# Patient Record
Sex: FEMALE | Race: WHITE | NOT HISPANIC OR LATINO | Employment: OTHER | URBAN - METROPOLITAN AREA
[De-identification: names, ages, dates, MRNs, and addresses within clinical notes are randomized per-mention and may not be internally consistent; named-entity substitution may affect disease eponyms.]

---

## 2023-04-17 LAB
ALANINE AMINOTRANSFERASE (SGPT) (U/L) IN SER/PLAS: 13 U/L (ref 7–45)
ALBUMIN (G/DL) IN SER/PLAS: 4 G/DL (ref 3.4–5)
ALKALINE PHOSPHATASE (U/L) IN SER/PLAS: 48 U/L (ref 33–136)
ANION GAP IN SER/PLAS: 12 MMOL/L (ref 10–20)
ASPARTATE AMINOTRANSFERASE (SGOT) (U/L) IN SER/PLAS: 18 U/L (ref 9–39)
BILIRUBIN TOTAL (MG/DL) IN SER/PLAS: 1 MG/DL (ref 0–1.2)
CALCIDIOL (25 OH VITAMIN D3) (NG/ML) IN SER/PLAS: 33 NG/ML
CALCIUM (MG/DL) IN SER/PLAS: 9.3 MG/DL (ref 8.6–10.3)
CARBON DIOXIDE, TOTAL (MMOL/L) IN SER/PLAS: 25 MMOL/L (ref 21–32)
CHLORIDE (MMOL/L) IN SER/PLAS: 103 MMOL/L (ref 98–107)
CHOLESTEROL (MG/DL) IN SER/PLAS: 142 MG/DL (ref 0–199)
CHOLESTEROL IN HDL (MG/DL) IN SER/PLAS: 68.7 MG/DL
CHOLESTEROL/HDL RATIO: 2.1
COBALAMIN (VITAMIN B12) (PG/ML) IN SER/PLAS: 1256 PG/ML (ref 211–911)
CREATININE (MG/DL) IN SER/PLAS: 0.89 MG/DL (ref 0.5–1.05)
ERYTHROCYTE DISTRIBUTION WIDTH (RATIO) BY AUTOMATED COUNT: 12.8 % (ref 11.5–14.5)
ERYTHROCYTE MEAN CORPUSCULAR HEMOGLOBIN CONCENTRATION (G/DL) BY AUTOMATED: 32.6 G/DL (ref 32–36)
ERYTHROCYTE MEAN CORPUSCULAR VOLUME (FL) BY AUTOMATED COUNT: 96 FL (ref 80–100)
ERYTHROCYTES (10*6/UL) IN BLOOD BY AUTOMATED COUNT: 4.31 X10E12/L (ref 4–5.2)
GFR FEMALE: 67 ML/MIN/1.73M2
GLUCOSE (MG/DL) IN SER/PLAS: 94 MG/DL (ref 74–99)
HEMATOCRIT (%) IN BLOOD BY AUTOMATED COUNT: 41.4 % (ref 36–46)
HEMOGLOBIN (G/DL) IN BLOOD: 13.5 G/DL (ref 12–16)
LDL: 60 MG/DL (ref 0–99)
LEUKOCYTES (10*3/UL) IN BLOOD BY AUTOMATED COUNT: 6.2 X10E9/L (ref 4.4–11.3)
MAGNESIUM (MG/DL) IN SER/PLAS: 1.76 MG/DL (ref 1.6–2.4)
PLATELETS (10*3/UL) IN BLOOD AUTOMATED COUNT: 257 X10E9/L (ref 150–450)
POTASSIUM (MMOL/L) IN SER/PLAS: 4.4 MMOL/L (ref 3.5–5.3)
PROTEIN TOTAL: 7.1 G/DL (ref 6.4–8.2)
SODIUM (MMOL/L) IN SER/PLAS: 136 MMOL/L (ref 136–145)
THYROTROPIN (MIU/L) IN SER/PLAS BY DETECTION LIMIT <= 0.05 MIU/L: 1.67 MIU/L (ref 0.44–3.98)
TRIGLYCERIDE (MG/DL) IN SER/PLAS: 67 MG/DL (ref 0–149)
UREA NITROGEN (MG/DL) IN SER/PLAS: 22 MG/DL (ref 6–23)
VLDL: 13 MG/DL (ref 0–40)

## 2023-04-20 PROBLEM — E55.9 VITAMIN D DEFICIENCY: Status: ACTIVE | Noted: 2023-04-20

## 2023-04-20 PROBLEM — R92.0 ABNORMAL FINDING ON MAMMOGRAPHY, MICROCALCIFICATION: Status: ACTIVE | Noted: 2023-04-20

## 2023-04-20 PROBLEM — M25.561 CHRONIC PAIN OF BOTH KNEES: Status: ACTIVE | Noted: 2023-04-20

## 2023-04-20 PROBLEM — R60.9 EDEMA: Status: ACTIVE | Noted: 2023-04-20

## 2023-04-20 PROBLEM — G89.29 CHRONIC PAIN OF BOTH KNEES: Status: ACTIVE | Noted: 2023-04-20

## 2023-04-20 PROBLEM — M19.90 ARTHRITIS: Status: ACTIVE | Noted: 2023-04-20

## 2023-04-20 PROBLEM — I10 BENIGN HYPERTENSION: Status: ACTIVE | Noted: 2023-04-20

## 2023-04-20 PROBLEM — R00.0 TACHYCARDIA: Status: ACTIVE | Noted: 2023-04-20

## 2023-04-20 PROBLEM — G47.00 INSOMNIA: Status: ACTIVE | Noted: 2023-04-20

## 2023-04-20 PROBLEM — M81.0 OSTEOPOROSIS OF LUMBAR SPINE: Status: ACTIVE | Noted: 2023-04-20

## 2023-04-20 PROBLEM — M25.562 CHRONIC PAIN OF BOTH KNEES: Status: ACTIVE | Noted: 2023-04-20

## 2023-04-20 PROBLEM — E78.5 DYSLIPIDEMIA: Status: ACTIVE | Noted: 2023-04-20

## 2023-04-20 PROBLEM — G50.0 TRIGEMINAL NEURALGIA: Status: ACTIVE | Noted: 2023-04-20

## 2023-04-20 PROBLEM — I83.93 VARICOSE VEINS OF LEGS: Status: ACTIVE | Noted: 2023-04-20

## 2023-04-20 PROBLEM — J31.0 CHRONIC RHINITIS: Status: ACTIVE | Noted: 2023-04-20

## 2023-04-20 RX ORDER — LORAZEPAM 1 MG/1
1 TABLET ORAL NIGHTLY
COMMUNITY
Start: 2013-01-31 | End: 2023-05-09 | Stop reason: SDUPTHER

## 2023-04-20 RX ORDER — LOSARTAN POTASSIUM 50 MG/1
50 TABLET ORAL DAILY
COMMUNITY
Start: 2016-01-19 | End: 2023-06-26

## 2023-04-20 RX ORDER — ERGOCALCIFEROL 1.25 MG/1
50000 CAPSULE ORAL
COMMUNITY
Start: 2021-01-21 | End: 2023-11-09 | Stop reason: ALTCHOICE

## 2023-04-20 RX ORDER — NAPROXEN 500 MG/1
500 TABLET ORAL
COMMUNITY
Start: 2019-12-03 | End: 2023-11-09 | Stop reason: ALTCHOICE

## 2023-04-20 RX ORDER — ATORVASTATIN CALCIUM 10 MG/1
10 TABLET, FILM COATED ORAL DAILY
COMMUNITY
Start: 2022-07-27

## 2023-04-20 RX ORDER — DULOXETIN HYDROCHLORIDE 30 MG/1
30 CAPSULE, DELAYED RELEASE ORAL DAILY
COMMUNITY
Start: 2019-12-03 | End: 2023-06-09 | Stop reason: SDUPTHER

## 2023-04-20 RX ORDER — ATENOLOL 25 MG/1
25 TABLET ORAL DAILY
COMMUNITY
Start: 2022-02-15 | End: 2023-11-06 | Stop reason: SDUPTHER

## 2023-04-24 ENCOUNTER — OFFICE VISIT (OUTPATIENT)
Dept: PRIMARY CARE | Facility: CLINIC | Age: 78
End: 2023-04-24
Payer: MEDICARE

## 2023-04-24 VITALS
WEIGHT: 231 LBS | BODY MASS INDEX: 39.44 KG/M2 | HEART RATE: 78 BPM | OXYGEN SATURATION: 98 % | SYSTOLIC BLOOD PRESSURE: 128 MMHG | DIASTOLIC BLOOD PRESSURE: 80 MMHG | HEIGHT: 64 IN | RESPIRATION RATE: 16 BRPM | TEMPERATURE: 97.3 F

## 2023-04-24 DIAGNOSIS — M25.561 CHRONIC PAIN OF BOTH KNEES: ICD-10-CM

## 2023-04-24 DIAGNOSIS — Z12.31 VISIT FOR SCREENING MAMMOGRAM: ICD-10-CM

## 2023-04-24 DIAGNOSIS — G89.29 CHRONIC PAIN OF BOTH KNEES: ICD-10-CM

## 2023-04-24 DIAGNOSIS — M81.0 OSTEOPOROSIS OF LUMBAR SPINE: ICD-10-CM

## 2023-04-24 DIAGNOSIS — F51.01 PRIMARY INSOMNIA: ICD-10-CM

## 2023-04-24 DIAGNOSIS — E66.01 OBESITY, MORBID (MULTI): ICD-10-CM

## 2023-04-24 DIAGNOSIS — I10 BENIGN HYPERTENSION: ICD-10-CM

## 2023-04-24 DIAGNOSIS — M25.562 CHRONIC PAIN OF BOTH KNEES: ICD-10-CM

## 2023-04-24 DIAGNOSIS — Z00.00 HEALTHCARE MAINTENANCE: Primary | ICD-10-CM

## 2023-04-24 DIAGNOSIS — Z79.899 HIGH RISK MEDICATION USE: ICD-10-CM

## 2023-04-24 DIAGNOSIS — E78.5 DYSLIPIDEMIA: ICD-10-CM

## 2023-04-24 PROCEDURE — 99397 PER PM REEVAL EST PAT 65+ YR: CPT | Performed by: INTERNAL MEDICINE

## 2023-04-24 PROCEDURE — G0444 DEPRESSION SCREEN ANNUAL: HCPCS | Performed by: INTERNAL MEDICINE

## 2023-04-24 PROCEDURE — G0446 INTENS BEHAVE THER CARDIO DX: HCPCS | Performed by: INTERNAL MEDICINE

## 2023-04-24 PROCEDURE — 99214 OFFICE O/P EST MOD 30 MIN: CPT | Performed by: INTERNAL MEDICINE

## 2023-04-24 PROCEDURE — G0442 ANNUAL ALCOHOL SCREEN 15 MIN: HCPCS | Performed by: INTERNAL MEDICINE

## 2023-04-24 PROCEDURE — 1160F RVW MEDS BY RX/DR IN RCRD: CPT | Performed by: INTERNAL MEDICINE

## 2023-04-24 PROCEDURE — 1159F MED LIST DOCD IN RCRD: CPT | Performed by: INTERNAL MEDICINE

## 2023-04-24 PROCEDURE — 3079F DIAST BP 80-89 MM HG: CPT | Performed by: INTERNAL MEDICINE

## 2023-04-24 PROCEDURE — 1036F TOBACCO NON-USER: CPT | Performed by: INTERNAL MEDICINE

## 2023-04-24 PROCEDURE — 3074F SYST BP LT 130 MM HG: CPT | Performed by: INTERNAL MEDICINE

## 2023-04-24 PROCEDURE — G0439 PPPS, SUBSEQ VISIT: HCPCS | Performed by: INTERNAL MEDICINE

## 2023-04-24 PROCEDURE — 1170F FXNL STATUS ASSESSED: CPT | Performed by: INTERNAL MEDICINE

## 2023-04-24 ASSESSMENT — PATIENT HEALTH QUESTIONNAIRE - PHQ9
SUM OF ALL RESPONSES TO PHQ9 QUESTIONS 1 AND 2: 0
SUM OF ALL RESPONSES TO PHQ9 QUESTIONS 1 AND 2: 0
2. FEELING DOWN, DEPRESSED OR HOPELESS: NOT AT ALL
1. LITTLE INTEREST OR PLEASURE IN DOING THINGS: NOT AT ALL
2. FEELING DOWN, DEPRESSED OR HOPELESS: NOT AT ALL
1. LITTLE INTEREST OR PLEASURE IN DOING THINGS: NOT AT ALL

## 2023-04-24 ASSESSMENT — ACTIVITIES OF DAILY LIVING (ADL)
GROCERY_SHOPPING: INDEPENDENT
MANAGING_FINANCES: INDEPENDENT
BATHING: INDEPENDENT
BATHING: INDEPENDENT
DRESSING: INDEPENDENT
DOING_HOUSEWORK: INDEPENDENT
DRESSING: INDEPENDENT
TAKING_MEDICATION: INDEPENDENT

## 2023-04-24 NOTE — PROGRESS NOTES
Subjective   Reason for Visit: Mirtha Swann is an 77 y.o. female here for her Subsequent Medicare Assessment and annual physical          The Ativan relaxes her and helps her sleep   She has 3 episodes of nocturia at night depending on what she has eaten  She wears a pad to bed for leakage     She has trouble hearing when there is a lot of background noise     She does not exercise.  She is her husbands caregiver and has a hard time finding the time to exercise   She makes a healthy breakfast. She sometimes skips lunch.  If she eats a large lunch she will eat 1/2 a sandwich for dinner   She admits she likes her sweets. She is eating fresh donuts weekly   She eats fruits and vegetables daily   She is not happy with her weight      HEALTH  PAP 2012 and no longer needed   Mammo 1/14 , 2/15 , 5/16 , 5/20/17, 6/18, orders placed 3/19, 6/19, 6/2021, 6/2022, ordered 2/2023  BD in 6/19 showed T -1.1 hip and spine T-2.7, 6/2021 T-2.5, ordered 2/2023   Colon 12/07 and Q10 Dr Issa Turcios 12/18 - and 12/2021 - and Q 3  EKG 12/13 , 1/16 , 3/18, 1/2021   Urine 2013 , 1/15 , 1/16 , 2/17, 3/18, 3/19   Hep C 3/18-  FLU 10/18/2021, 12/2022   TDAP 2011, will update with injury   Prevnar recommended and will consider   Pneumovax 10/22/2021  Zostavax never and declines  Shingrix recommended and will check local pharmacies   Moderna CVD vaccine 3/2/2021 and 3/30/2021 Pfizer booster 11/2/2021, 10/25/2022  Ophth She was seen at [facility] 2022, she wears glasses. No glaucoma /MD. She has bilateral cataracts OU but not ready for surgery. Her mother had AMD.   Copy of her Advanced Directives scanned in her chart 4/2022       Patient Care Team:  Katarzyna Vickers MD as PCP - General  Katarzyna Vickers MD as PCP - [employer] PCP     Review of Systems  All systems negative except those listed in the HPI      Past Medical, Surgical, and Family History reviewed and updated in chart.  Reviewed all medications by  prescribing practitioner or clinical pharmacist   (such as prescriptions, OTCs, herbal therapies and supplements) and documented in the medical record.    Objective   Vitals:  Visit Vitals  /80   Pulse 78   Temp 36.3 °C (97.3 °F)   Resp 16    Body mass index is 39.65 kg/m².     Physical Exam  Vitals reviewed.   Constitutional:       Appearance: Normal appearance. She is obese.   HENT:      Head: Normocephalic.      Right Ear: Tympanic membrane, ear canal and external ear normal.      Left Ear: Tympanic membrane, ear canal and external ear normal.      Nose: Nose normal.      Mouth/Throat:      Pharynx: Oropharynx is clear.   Eyes:      Conjunctiva/sclera: Conjunctivae normal.   Cardiovascular:      Rate and Rhythm: Normal rate and regular rhythm.      Pulses: Normal pulses.      Heart sounds: Normal heart sounds.   Pulmonary:      Effort: Pulmonary effort is normal.      Breath sounds: Normal breath sounds.   Abdominal:      General: Bowel sounds are normal.      Palpations: Abdomen is soft.   Musculoskeletal:         General: Normal range of motion.      Cervical back: Normal range of motion and neck supple.   Skin:     General: Skin is warm.   Neurological:      General: No focal deficit present.      Mental Status: She is alert and oriented to person, place, and time.   Psychiatric:         Mood and Affect: Mood normal.         Behavior: Behavior normal.         Thought Content: Thought content normal.         Judgment: Judgment normal.       Assessment/Plan       Subsequent Medicare Assessment and annual physical completed   Reviewed her labs from 4/2023    Medicare Wellness completed  -  Discussed healthy diet and regular exercise.    -  Physical exam overall unremarkable. Immunizations reviewed and updated accordingly. Healthy lifestyle choices discussed (tobacco avoidance, appropriate alcohol use, avoidance of illicit substances).   -  Patient is wearing seatbelt.   -  Screening lab work ordered as  indicated.    -  Age appropriate screening tests reviewed with patient.        We spent 15 minutes discussing depression screen and there is nothing found that is of concern for underling depression. The PQH form was filled and the meds reviewed.  No depression to report     We spent 15 minutes discussing alcohol use and there are no concerns about overuse. The 15 min was spent in going over any issues of use of alcohol. None     She has grab bars in the shower.  She has not fallen recently and no risk of falls in the house   She has good lighting around the house and functioning smoke detectors.     She has trouble hearing when there is a lot of background noise     Her weight in office today is 231 with BMI of 39.65. We spent 15 minutes discussing diet and weight loss. The struggle of weight loss persists   She makes a healthy breakfast. She sometimes skips lunch.  If she eats a large lunch she will eat 1/2 a sandwich for dinner   She does all the cleaning and cooking   She is her husbands caregiver and has a hard time finding the time to exercise   She does like her sweets and eats fresh donuts weekly   I would like to see her BMI below 30.     HTN: Stable.   Continue losartan 50 mg daily and atenolol 25 mg QHS.   Recommend she decrease her coffee intake   EKG was normal sinus HR 80 in 7/2021, no LVH or strain pattern noted   She monitors her daily sodium intake. Increase hydration with water   Continue to monitor her BP at home and call my office with elevated readings.     I have spent 15 min face to face with this patient discussing their cardiac risk and behavioral therapies of nutrition choices and exercise. We are trying to eliminate habits that are contributing to their cardiac risk.  We agreed on a plan of how they can reduce their current CV risk   The patient's 10 yr CV risk was estimated at 23.2 % We can decrease this to 12% if we add a statin and 6% if we get tighter control of her BP and add a statin  4/2023    Lipids controlled: Stable. 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  Neurontin did not help.     Insomnia and anxiety issues persist:   Her ruperto keeps her strong   She is the primary caregiver to her  who continues to decline.  He has bladder cancer and finished chemo treatment. He is 81 y/o.   She is under a lot of stress with her daughter (she lost 1 daughter over 19 yeas ago).   She lost her sister at age 57 the same year her mother passed   She has friends she goes to lunch with, plays cards with and shops with.   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 She is to avoid long term use of NSAIDs with Cymbalta   She sits in a chair in the quiet for an hour after taking the lorazepam at night to relax.   She is no longer taking Ambien   Refills given, OARRS run without flags 4/24/2023    OARRS:  I have personally reviewed the OARRS report for Mirtha Swann. I have considered the risks of abuse, dependence, addiction and diversion  Is the patient prescribed a combination of a benzodiazepine and opioid?  Yes, I feel it is clincially indicated to continue  the medication and have discussed with the patient risks/benefits/alternatives.  Last Urine Drug Screen / ordered today: Yes.   Results are as expected.   Controlled Substance Agreement:  Date of the Last Agreement: 4/24/2023. Urine drug screen 4/29/2022 and ordered 4/24/2023  Reviewed Controlled Substance Agreement including but not limited to the benefits, risks, and alternatives to treatment with a Controlled Substance medication(s).   Benzodiazepines:  What is the patient's goal of therapy? Assist with sleep and anxiety   Is this being achieved with current treatment? yes  Activities of Daily Living:   Is your overall impression that this patient is benefiting (symptom reduction outweighs side effects) from benzodiazepine therapy? Yes   1. Physical Functioning: Better  2. Family Relationship: Better  3. Social Relationship: Better  4. Mood: Better  5. Sleep Patterns: Better  6. Overall Function: Better    LE edema, arthritis and varicose veins:   She has lipomatous tissue on the inner thighs bilaterally especially around the knees.   We discussed weight loss to decrease LE edema and help with arthritic pain   She has varicose veins, she is not sure she wants to have this corrected   We stopped Maxzide due to LE cramping and up all night.   I referred her to Dr Maxwell for varicose vein evaluation, she did not keep appointment   Recommend wearing compression stockings at 20 - 30 mmHg.   Recommend elevating her legs for 45 minutes every afternoon  She declines medications for LE edema     Arthritis:   I would like for her to avoid Naproxen as much as possible   Continue Cymbalta 30 mg daily   Once she gets moving she does better     Right knee is positive DJD:   This has been worse with weight gain.  Xray bilateral knees 8/2021 showed arthritis only     Vitamin D def: Vitamin D 33 on labs in 4/2023.   Goal for Vitamin D to be 35 or higher due to history of breast cancer   Continue scripted Vitamin D once weekly.      Vitamin B 12 def: Levels normal in 4/2023  Continue OTC Vitamin D SL 1000 UT daily     She has a history of Left breast Ca+ in 2/15.   Mammo normal in 6/2022 and ordered 2/2023.   Breast exam normal 4/2023    BD in 6/2021 T-2.5 and ordered 2/2023. She declines Fosamax or like medication.   She is concerned about the medication because her systemic medications get caught in her throat.   She declines Prolia due to cost.   Continue Caltrate daily and eat 2 servings of calcium enriched foods daily. Continue scripted Vitamin D weekly Encouraged weight bearing exercise.     Colon in 12/07 and she will see Dr Parsons 4/18 for the last colonoscopy needed.  Cologuard in 12/2021 was normal and Q 3     Ophth:   She was seen at [facility] 2022, she wears glasses. No glaucoma /MD.   She has bilateral cataracts OU but not ready for surgery. Her mother had AMD.   She will have her next eye exam faxed to my office in order to update her medical records.    I spent 15 min with the patient discussing their wishes for end of life choices.   We discussed the need for a Living Will and that wishes should be discussed with Family. The DNR status was reviewed, and we discussed the options of this and, the DNR _CC options as well.   We also went over how important it was to have these choices written down and clear for any surviving family so that their wishes are followed   The patient and I came to to following agreement :   She has a living will and her  is her medical POA, her daughter and granddaughter are her secondary medical POA.   Copy of her Advanced Directives scanned in her chart 4/2022      Hep C 3/18-  FLU 10/18/2021, 12/2022   TDAP 2011, will update with injury   Prevnar recommended and will consider   Pneumovax 10/22/2021  Zostavax never and declines  Shingrix recommended and will check local pharmacies   Moderna CVD vaccine 3/2/2021 and 3/30/2021 Pfizer booster 11/2/2021, 10/25/2022  Blood type O negative    Disability placard given 4/29/2022 for 5 years     RTC in 3 months for follow up and medication refill or sooner if needed     Scribe Attestation  By signing my name below, I, Linda Wheeler , Scribe   attest that this documentation has been prepared under the direction and in the presence of Katarzyna Vickers MD.

## 2023-04-24 NOTE — PATIENT INSTRUCTIONS
It was a pleasure to see you today.  I would like to remind you about importance of a healthy lifestyle in order to improve your well-being and live longer. Try to engage in physical activities for at least 150 minutes per week.  Eat about 10 servings of fruits and vegetables daily. My advice is 2 servings of fruits and 8 servings of vegetables. For vegetables choose at least half of them green and at least half of them fresh.  Please avoid sugar, salt, fried food and saturated fat.  Weight loss is advised. Target BMI: below 25. Please follow low carbohydrate diet and daily exercise routine for at least 30 minutes. Nutritional consultation is available, please let me know if you are interested. I will be happy to discuss details with you if interested.   Have a good day and stay well.

## 2023-05-09 DIAGNOSIS — G47.00 INSOMNIA, UNSPECIFIED TYPE: Primary | ICD-10-CM

## 2023-05-09 RX ORDER — LORAZEPAM 1 MG/1
1 TABLET ORAL NIGHTLY
Qty: 30 TABLET | Refills: 0 | Status: SHIPPED | OUTPATIENT
Start: 2023-05-09 | End: 2023-06-09 | Stop reason: SDUPTHER

## 2023-06-09 DIAGNOSIS — M25.562 CHRONIC PAIN OF BOTH KNEES: ICD-10-CM

## 2023-06-09 DIAGNOSIS — G89.29 CHRONIC PAIN OF BOTH KNEES: ICD-10-CM

## 2023-06-09 DIAGNOSIS — M25.561 CHRONIC PAIN OF BOTH KNEES: ICD-10-CM

## 2023-06-09 DIAGNOSIS — G47.00 INSOMNIA, UNSPECIFIED TYPE: ICD-10-CM

## 2023-06-09 DIAGNOSIS — M19.90 ARTHRITIS: Primary | ICD-10-CM

## 2023-06-09 RX ORDER — LORAZEPAM 1 MG/1
1 TABLET ORAL NIGHTLY
Qty: 30 TABLET | Refills: 0 | Status: SHIPPED | OUTPATIENT
Start: 2023-06-09 | End: 2023-07-07 | Stop reason: SDUPTHER

## 2023-06-09 RX ORDER — DULOXETIN HYDROCHLORIDE 30 MG/1
30 CAPSULE, DELAYED RELEASE ORAL DAILY
Qty: 90 CAPSULE | Refills: 3 | Status: SHIPPED | OUTPATIENT
Start: 2023-06-09 | End: 2024-06-01

## 2023-06-26 DIAGNOSIS — I10 BENIGN HYPERTENSION: Primary | ICD-10-CM

## 2023-06-26 RX ORDER — LOSARTAN POTASSIUM 50 MG/1
TABLET ORAL
Qty: 90 TABLET | Refills: 0 | Status: SHIPPED | OUTPATIENT
Start: 2023-06-26 | End: 2023-12-08 | Stop reason: SDUPTHER

## 2023-07-05 ENCOUNTER — LAB (OUTPATIENT)
Dept: LAB | Facility: LAB | Age: 78
End: 2023-07-05
Payer: MEDICARE

## 2023-07-05 DIAGNOSIS — Z79.899 HIGH RISK MEDICATION USE: ICD-10-CM

## 2023-07-05 PROCEDURE — 80368 SEDATIVE HYPNOTICS: CPT

## 2023-07-05 PROCEDURE — 80358 DRUG SCREENING METHADONE: CPT

## 2023-07-05 PROCEDURE — 80354 DRUG SCREENING FENTANYL: CPT

## 2023-07-05 PROCEDURE — 80365 DRUG SCREENING OXYCODONE: CPT

## 2023-07-05 PROCEDURE — 80373 DRUG SCREENING TRAMADOL: CPT

## 2023-07-05 PROCEDURE — 80346 BENZODIAZEPINES1-12: CPT

## 2023-07-05 PROCEDURE — 80361 OPIATES 1 OR MORE: CPT

## 2023-07-05 PROCEDURE — 80307 DRUG TEST PRSMV CHEM ANLYZR: CPT

## 2023-07-07 DIAGNOSIS — G47.00 INSOMNIA, UNSPECIFIED TYPE: ICD-10-CM

## 2023-07-07 RX ORDER — LORAZEPAM 1 MG/1
1 TABLET ORAL NIGHTLY
Qty: 30 TABLET | Refills: 0 | Status: SHIPPED | OUTPATIENT
Start: 2023-07-07 | End: 2023-08-09 | Stop reason: SDUPTHER

## 2023-07-10 LAB
6-ACETYLMORPHINE: <25 NG/ML
7-AMINOCLONAZEPAM: <25 NG/ML
ALPHA-HYDROXYALPRAZOLAM: <25 NG/ML
ALPHA-HYDROXYMIDAZOLAM: <25 NG/ML
ALPRAZOLAM: <25 NG/ML
AMPHETAMINE (PRESENCE) IN URINE BY SCREEN METHOD: ABNORMAL
BARBITURATES PRESENCE IN URINE BY SCREEN METHOD: ABNORMAL
CANNABINOIDS IN URINE BY SCREEN METHOD: ABNORMAL
CHLORDIAZEPOXIDE: <25 NG/ML
CLONAZEPAM: <25 NG/ML
COCAINE (PRESENCE) IN URINE BY SCREEN METHOD: ABNORMAL
CODEINE: <50 NG/ML
CREATINE, URINE FOR DRUG: 31.3 MG/DL
DIAZEPAM: <25 NG/ML
DRUG SCREEN COMMENT URINE: ABNORMAL
EDDP: <25 NG/ML
FENTANYL CONFIRMATION, URINE: <2.5 NG/ML
HYDROCODONE: <25 NG/ML
HYDROMORPHONE: <25 NG/ML
LORAZEPAM: 247 NG/ML
METHADONE CONFIRMATION,URINE: <25 NG/ML
MIDAZOLAM: <25 NG/ML
MORPHINE URINE: <50 NG/ML
NORDIAZEPAM: <25 NG/ML
NORFENTANYL: <2.5 NG/ML
NORHYDROCODONE: <25 NG/ML
NOROXYCODONE: <25 NG/ML
O-DESMETHYLTRAMADOL: <50 NG/ML
OXAZEPAM: <25 NG/ML
OXYCODONE: <25 NG/ML
OXYMORPHONE: <25 NG/ML
PHENCYCLIDINE (PRESENCE) IN URINE BY SCREEN METHOD: ABNORMAL
TEMAZEPAM: <25 NG/ML
TRAMADOL: <50 NG/ML
ZOLPIDEM METABOLITE (ZCA): <25 NG/ML
ZOLPIDEM: <25 NG/ML

## 2023-07-21 ENCOUNTER — TELEPHONE (OUTPATIENT)
Dept: PRIMARY CARE | Facility: CLINIC | Age: 78
End: 2023-07-21
Payer: MEDICARE

## 2023-07-24 ENCOUNTER — APPOINTMENT (OUTPATIENT)
Dept: PRIMARY CARE | Facility: CLINIC | Age: 78
End: 2023-07-24
Payer: MEDICARE

## 2023-07-24 NOTE — TELEPHONE ENCOUNTER
Talked to patient   She is dealing with her 57 year old daughter passing away from COPD and will have the memorial weekend   She feels blessed with having her grand kids

## 2023-08-09 ENCOUNTER — OFFICE VISIT (OUTPATIENT)
Dept: PRIMARY CARE | Facility: CLINIC | Age: 78
End: 2023-08-09
Payer: MEDICARE

## 2023-08-09 VITALS
WEIGHT: 228 LBS | DIASTOLIC BLOOD PRESSURE: 80 MMHG | TEMPERATURE: 96.5 F | HEART RATE: 68 BPM | OXYGEN SATURATION: 98 % | HEIGHT: 64 IN | BODY MASS INDEX: 38.93 KG/M2 | SYSTOLIC BLOOD PRESSURE: 140 MMHG

## 2023-08-09 DIAGNOSIS — I10 BENIGN HYPERTENSION: Primary | ICD-10-CM

## 2023-08-09 DIAGNOSIS — F51.01 PRIMARY INSOMNIA: ICD-10-CM

## 2023-08-09 DIAGNOSIS — E78.5 DYSLIPIDEMIA: ICD-10-CM

## 2023-08-09 DIAGNOSIS — G47.00 INSOMNIA, UNSPECIFIED TYPE: ICD-10-CM

## 2023-08-09 DIAGNOSIS — M25.562 CHRONIC PAIN OF BOTH KNEES: ICD-10-CM

## 2023-08-09 DIAGNOSIS — R60.0 LOCALIZED EDEMA: ICD-10-CM

## 2023-08-09 DIAGNOSIS — M25.561 CHRONIC PAIN OF BOTH KNEES: ICD-10-CM

## 2023-08-09 DIAGNOSIS — G89.29 CHRONIC PAIN OF BOTH KNEES: ICD-10-CM

## 2023-08-09 PROCEDURE — 3077F SYST BP >= 140 MM HG: CPT | Performed by: INTERNAL MEDICINE

## 2023-08-09 PROCEDURE — 3079F DIAST BP 80-89 MM HG: CPT | Performed by: INTERNAL MEDICINE

## 2023-08-09 PROCEDURE — 99214 OFFICE O/P EST MOD 30 MIN: CPT | Performed by: INTERNAL MEDICINE

## 2023-08-09 PROCEDURE — 1160F RVW MEDS BY RX/DR IN RCRD: CPT | Performed by: INTERNAL MEDICINE

## 2023-08-09 PROCEDURE — 1036F TOBACCO NON-USER: CPT | Performed by: INTERNAL MEDICINE

## 2023-08-09 PROCEDURE — 1159F MED LIST DOCD IN RCRD: CPT | Performed by: INTERNAL MEDICINE

## 2023-08-09 RX ORDER — LORAZEPAM 1 MG/1
1 TABLET ORAL NIGHTLY
Qty: 30 TABLET | Refills: 2 | Status: SHIPPED | OUTPATIENT
Start: 2023-08-09 | End: 2023-11-03 | Stop reason: SDUPTHER

## 2023-11-03 ENCOUNTER — TELEPHONE (OUTPATIENT)
Dept: PRIMARY CARE | Facility: CLINIC | Age: 78
End: 2023-11-03
Payer: MEDICARE

## 2023-11-03 DIAGNOSIS — G47.00 INSOMNIA, UNSPECIFIED TYPE: ICD-10-CM

## 2023-11-03 DIAGNOSIS — I10 BENIGN HYPERTENSION: Primary | ICD-10-CM

## 2023-11-03 RX ORDER — LORAZEPAM 1 MG/1
1 TABLET ORAL NIGHTLY
Qty: 30 TABLET | Refills: 2 | Status: SHIPPED | OUTPATIENT
Start: 2023-11-03 | End: 2024-02-06 | Stop reason: SDUPTHER

## 2023-11-06 RX ORDER — ATENOLOL 25 MG/1
25 TABLET ORAL DAILY
Qty: 90 TABLET | Refills: 1 | Status: SHIPPED | OUTPATIENT
Start: 2023-11-06 | End: 2024-05-02

## 2023-11-08 NOTE — PROGRESS NOTES
Subjective   Patient ID: Mirtha Swann is a 78 y.o. female who presents for her 3 month follow up multiple medical conditions and medication refill     She states her BP is elevated today IO because she thought she was going to be late for this appt.  She just found out the cause of death for her daughter this week which was HTN cardiovascular disease. She feels she is coping well but she still has some moments where she thinks about her daughter that just passed     She is compliant with her systemic medications   She takes losartan and Cymbalta in the mornings and atenolol at night   She is taking atorvastatin 3 days a week     She is not having any issues with the trigeminal neuralgia     She takes the Ativan at night then she sits and relaxes for a while rocking in the [email or web address]      HEALTH  PAP 2012 and no longer needed   Mammo 1/14 , 2/15 , 5/16 , 5/20/17, 6/18, orders placed 3/19, 6/19, 6/2021, 6/2022, 7/2023  BD in 6/19 T -1.1 hip and spine T-2.7, 6/2021 T-2.5, 7/2023 T-2.3  Colon 12/07 and Q 10 Dr Issa Turcios 12/18 - and 12/2021 - and Q 3  EKG 12/13 , 1/16 , 3/18, 1/2021   Urine 2013 , 1/15 , 1/16 , 2/17, 3/18, 3/19   Hep C 3/18-  FLU 10/18/2021, 12/2022, will get 2023  TDAP 2011, will update with injury   Arexvy recommend and will consider   Prevnar recommended and will consider   Pneumovax 10/22/2021  Zostavax never and declines  Shingrix recommended and will check local pharmacies   Moderna CVD vaccine 3/2/2021 and 3/30/2021 Pfizer booster 11/2/2021, 10/25/2022  Ophth She was seen at [facility] 2022, she wears glasses. No glaucoma /MD. She has bilateral cataracts OU but not ready for surgery. Her mother had AMD.   Copy of her Advanced Directives scanned in her chart 4/2022        Review of Systems  All systems negative except those listed in the HPI      Objective   Visit Vitals  /60 (BP Location: Left arm, Patient Position: Sitting)   Pulse 74   Temp 36.3 °C (97.3 °F)    Body mass  index is 38.79 kg/m².      Visit Vitals  /62   Pulse 74   Temp 36.3 °C (97.3 °F)    Repeat BP by Dr Katarzyna Vickers MD 11/8/2023, right arm sitting     Physical Exam  Vitals reviewed.   Constitutional:       Appearance: Normal appearance. She is obese.   HENT:      Head: Normocephalic.      Right Ear: Tympanic membrane, ear canal and external ear normal.      Left Ear: Tympanic membrane, ear canal and external ear normal.      Nose: Nose normal.      Mouth/Throat:      Mouth: Mucous membranes are dry.      Pharynx: Oropharynx is clear.   Eyes:      Conjunctiva/sclera: Conjunctivae normal.   Cardiovascular:      Rate and Rhythm: Normal rate and regular rhythm.      Pulses: Normal pulses.      Heart sounds: Normal heart sounds.      Comments: Trace edema bilateral ankles   Pulmonary:      Effort: Pulmonary effort is normal.      Breath sounds: Normal breath sounds.   Abdominal:      General: Bowel sounds are normal.      Palpations: Abdomen is soft.   Musculoskeletal:         General: Normal range of motion.      Cervical back: Normal range of motion and neck supple.   Skin:     General: Skin is warm.   Neurological:      General: No focal deficit present.      Mental Status: She is alert and oriented to person, place, and time.   Psychiatric:         Mood and Affect: Mood normal.         Behavior: Behavior normal.         Thought Content: Thought content normal.         Judgment: Judgment normal.       Assessment/Plan   Problem List Items Addressed This Visit       Benign hypertension - Primary    Dyslipidemia    Edema    Insomnia    Tachycardia    RESOLVED: Trigeminal neuralgia     Other Visit Diagnoses       Class 2 drug-induced obesity with serious comorbidity and body mass index (BMI) of 38.0 to 38.9 in adult        BMI 38.0-38.9,adult                Follow up completed    She is going to be with family on [facility] and [place]   Her  is on 50% disability for being a      She has trouble  hearing when there is a lot of background noise      Her weight in office today is 226 with BMI of 38.79. We spent 15 minutes discussing diet and weight loss. The struggle of weight loss persists   She makes a healthy breakfast. She sometimes skips lunch.     HTN: Not ideal on arrival 11/2023. Repeat BP improved 11/2023. She states her BP is elevated today IO because she thought she was going to be late for this appt.   Continue losartan 50 mg daily and atenolol 25 mg QHS.   Recommend she decrease her coffee intake   EKG was normal sinus HR 80 in 7/2021, no LVH or strain pattern noted   She monitors her daily sodium intake. Increase hydration with water   Continue to monitor her BP at home and call my office with elevated readings.      I have spent 15 min face to face with this patient discussing their cardiac risk and behavioral therapies of nutrition choices and exercise. We are trying to eliminate habits that are contributing to their cardiac risk.  We agreed on a plan of how they can reduce their current CV risk   The patient's  10 yr CV risk was estimated at 31.1 % 11/2023. We can decrease her risk quite a bit if we get tighter control of her BP.      HLD: Stable. 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 She feels she is coping well but she still has some moments where she thinks about her daughter that just passed    She is grieving the passing of her daughter, she had COPD and was 57.  She had 2 daughters from her first marriage and they have both passed.   She lost one daughter over 20 years ago  She is the primary caregiver to her  who continues to decline.  He has bladder cancer and finished chemo treatment. He is 79 y/o.   She lost her sister at age 57 the same year her mother passed    Her ruperto keeps her strong   She has friends she goes to lunch with, plays cards with and shops with.   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   She is to avoid long term use of NSAIDs with Cymbalta   She takes the Ativan at night then she sits and relaxes for a while rocking in the rocking chair    She is no longer taking Ambien   Refills given, OARRS run without flags 11/9/2023    OARRS:  Dr Katarzyna Vickers MD   I have personally reviewed the OARRS report for Mirtha Swann. I have considered the risks of abuse, dependence, addiction and diversion  Is the patient prescribed a combination of a benzodiazepine and opioid?  No  Last Urine Drug Screen / ordered today: No  Recent Results (from the past 8760 hour(s))   OPIATE/OPIOID/BENZO PRESCRIPTION COMPLIANCE    Collection Time: 07/05/23 10:24 AM   Result Value Ref Range    DRUG SCREEN COMMENT URINE SEE BELOW     Creatine, Urine 31.3 mg/dL    Amphetamine Screen, Urine PRESUMPTIVE NEGATIVE NEGATIVE    Barbiturate Screen, Urine PRESUMPTIVE NEGATIVE NEGATIVE    Cannabinoid Screen, Urine PRESUMPTIVE NEGATIVE NEGATIVE    Cocaine Screen, Urine PRESUMPTIVE NEGATIVE NEGATIVE    PCP Screen, Urine PRESUMPTIVE  NEGATIVE NEGATIVE    7-Aminoclonazepam <25 Cutoff <25 ng/mL    Alpha-Hydroxyalprazolam <25 Cutoff <25 ng/mL    Alpha-Hydroxymidazolam <25 Cutoff <25 ng/mL    Alprazolam <25 Cutoff <25 ng/mL    Chlordiazepoxide <25 Cutoff <25 ng/mL    Clonazepam <25 Cutoff <25 ng/mL    Diazepam <25 Cutoff <25 ng/mL    Lorazepam 247 (A) Cutoff <25 ng/mL    Midazolam <25 Cutoff <25 ng/mL    Nordiazepam <25 Cutoff <25 ng/mL    Oxazepam <25 Cutoff <25 ng/mL    Temazepam <25 Cutoff <25 ng/mL    Zolpidem <25 Cutoff <25 ng/mL    Zolpidem Metabolite (ZCA) <25 Cutoff <25 ng/mL    6-Acetylmorphine <25 Cutoff <25 ng/mL    Codeine <50 Cutoff <50 ng/mL    Hydrocodone <25 Cutoff <25 ng/mL    Hydromorphone <25 Cutoff <25 ng/mL    Morphine Urine <50 Cutoff <50 ng/mL    Norhydrocodone <25 Cutoff <25 ng/mL    Noroxycodone <25 Cutoff <25 ng/mL    Oxycodone <25 Cutoff <25 ng/mL    Oxymorphone <25 Cutoff <25 ng/mL    Tramadol <50 Cutoff <50 ng/mL    O-Desmethyltramadol <50 Cutoff <50 ng/mL    Fentanyl <2.5 Cutoff<2.5 ng/mL    Norfentanyl <2.5 Cutoff<2.5 ng/mL    METHADONE CONFIRMATION,URINE <25 Cutoff <25 ng/mL    EDDP <25 Cutoff <25 ng/mL     Results are as expected.   Controlled Substance Agreement:  Date of the Last Agreement: 4/24/2023. Urine drug screen 7/5/2023  Reviewed Controlled Substance Agreement including but not limited to the benefits, risks, and alternatives to treatment with a Controlled Substance medication(s).  Benzodiazepines:  What is the patient's goal of therapy? Decrease anxiety   Is this being achieved with current treatment? yes  Activities of Daily Living:   Is your overall impression that this patient is benefiting (symptom reduction outweighs side effects) from benzodiazepine therapy? Yes   1. Physical Functioning: Better  2. Family Relationship: Better  3. Social Relationship: Better  4. Mood: Better  5. Sleep Patterns: Better  6. Overall Function: Better   LE edema and varicose veins: On exam: trace edema BLE 8/2023  She has  lipomatous tissue on the inner thighs bilaterally especially around the knees.   We discussed weight loss to decrease LE edema and help with arthritic pain   She has varicose veins, she is not sure she wants to have this corrected   We stopped Maxzide due to LE cramping and up all night.   I referred her to Dr Maxwell for varicose vein evaluation, she did not keep appointment   Recommend wearing compression stockings at 20 - 30 mmHg.   Recommend elevating her legs for 45 minutes every afternoon  She declines medications for LE edema      Arthritis:   I would like for her to avoid Naproxen as much as possible   Continue Cymbalta 30 mg daily   Once she gets moving she does better      Right knee is positive DJD:   This has been worse with weight gain.  Xray bilateral knees 8/2021 showed arthritis only   Recommend using a knee brace when active      Vitamin D def: Vitamin D 33 on labs in 4/2023.   Goal for Vitamin D to be 35 or higher due to history of breast cancer   Continue scripted Vitamin D once weekly.      Vitamin B 12 def: Levels normal in 4/2023  Continue OTC Vitamin D SL 1000 UT daily      She has a history of Left breast Ca+ in 2/15.   Mammo normal in 7/2023  Breast exam normal 4/2023     BD in 7/2023 T-2.3. She declines Fosamax or like medication.   She is concerned about the medication because her systemic medications get caught in her throat.   She declines Prolia due to cost.   Continue OTC Caltrate daily, scripted Vitamin D weekly and eat 2 servings of calcium enriched foods daily. Encouraged weight bearing exercise.      Colon in 12/07 and she will see Dr Parsons 4/18 for the last colonoscopy needed.  Cologuard in 12/2021 was normal and Q 3       Ophth:   She was seen at [facility], she wears glasses. No glaucoma /MD.   She has bilateral cataracts OU but not ready for surgery. Her mother had AMD.   She will have her next eye exam faxed to my office in order to update her medical records.     She  has a living will and her  is her medical POA, her daughter and granddaughter are her secondary medical POA.   Copy of her Advanced Directives scanned in her chart 4/2022        Hep C 3/18-  FLU 10/18/2021, 12/2022, will get 2023  TDAP 2011, will update with injury   Arexvy recommend and will consider   Prevnar recommended and will consider   Pneumovax 10/22/2021  Zostavax never and declines  Shingrix recommended and will check local pharmacies   Moderna CVD vaccine 3/2/2021 and 3/30/2021 Pfizer booster 11/2/2021, 10/25/2022  Blood type O negative   Disability placard given 4/29/2022 for 5 years      Some elements in the chart were copied from Dr. Vickers's last office visit with patient.   Notes have been updated where appropriate, and reflect my current medical decision making from today.      RTC in 3 months for follow up and medication refill or sooner if needed   (MCR due 4/2024)      Scribe Attestation  By signing my name below, I, Linda Wheeler , Scribe   attest that this documentation has been prepared under the direction and in the presence of Katarzyna Vickers MD.

## 2023-11-09 ENCOUNTER — OFFICE VISIT (OUTPATIENT)
Dept: PRIMARY CARE | Facility: CLINIC | Age: 78
End: 2023-11-09
Payer: MEDICARE

## 2023-11-09 VITALS
WEIGHT: 226 LBS | TEMPERATURE: 97.3 F | HEIGHT: 64 IN | BODY MASS INDEX: 38.58 KG/M2 | OXYGEN SATURATION: 96 % | SYSTOLIC BLOOD PRESSURE: 134 MMHG | HEART RATE: 74 BPM | DIASTOLIC BLOOD PRESSURE: 62 MMHG

## 2023-11-09 DIAGNOSIS — F51.01 PRIMARY INSOMNIA: ICD-10-CM

## 2023-11-09 DIAGNOSIS — R00.0 TACHYCARDIA: ICD-10-CM

## 2023-11-09 DIAGNOSIS — E78.5 DYSLIPIDEMIA: ICD-10-CM

## 2023-11-09 DIAGNOSIS — R60.0 LOCALIZED EDEMA: ICD-10-CM

## 2023-11-09 DIAGNOSIS — G50.0 TRIGEMINAL NEURALGIA: ICD-10-CM

## 2023-11-09 DIAGNOSIS — E66.1 CLASS 2 DRUG-INDUCED OBESITY WITH SERIOUS COMORBIDITY AND BODY MASS INDEX (BMI) OF 38.0 TO 38.9 IN ADULT: ICD-10-CM

## 2023-11-09 DIAGNOSIS — I10 BENIGN HYPERTENSION: Primary | ICD-10-CM

## 2023-11-09 DIAGNOSIS — G47.00 INSOMNIA, UNSPECIFIED TYPE: ICD-10-CM

## 2023-11-09 PROBLEM — Z78.0 MENOPAUSE: Status: ACTIVE | Noted: 2023-11-09

## 2023-11-09 PROBLEM — E66.01 CLASS 2 SEVERE OBESITY DUE TO EXCESS CALORIES WITH SERIOUS COMORBIDITY AND BODY MASS INDEX (BMI) OF 39.0 TO 39.9 IN ADULT (MULTI): Status: ACTIVE | Noted: 2023-11-09

## 2023-11-09 PROBLEM — E66.812 CLASS 2 SEVERE OBESITY DUE TO EXCESS CALORIES WITH SERIOUS COMORBIDITY AND BODY MASS INDEX (BMI) OF 39.0 TO 39.9 IN ADULT: Status: ACTIVE | Noted: 2023-11-09

## 2023-11-09 PROBLEM — E66.01 OBESITY, MORBID (MULTI): Status: RESOLVED | Noted: 2023-04-24 | Resolved: 2023-11-09

## 2023-11-09 PROBLEM — E66.812 CLASS 2 SEVERE OBESITY DUE TO EXCESS CALORIES WITH SERIOUS COMORBIDITY AND BODY MASS INDEX (BMI) OF 39.0 TO 39.9 IN ADULT: Status: RESOLVED | Noted: 2023-11-09 | Resolved: 2023-11-09

## 2023-11-09 PROBLEM — E66.01 CLASS 2 SEVERE OBESITY DUE TO EXCESS CALORIES WITH SERIOUS COMORBIDITY AND BODY MASS INDEX (BMI) OF 39.0 TO 39.9 IN ADULT (MULTI): Status: RESOLVED | Noted: 2023-11-09 | Resolved: 2023-11-09

## 2023-11-09 PROCEDURE — 3075F SYST BP GE 130 - 139MM HG: CPT | Performed by: INTERNAL MEDICINE

## 2023-11-09 PROCEDURE — 1036F TOBACCO NON-USER: CPT | Performed by: INTERNAL MEDICINE

## 2023-11-09 PROCEDURE — 1159F MED LIST DOCD IN RCRD: CPT | Performed by: INTERNAL MEDICINE

## 2023-11-09 PROCEDURE — 1160F RVW MEDS BY RX/DR IN RCRD: CPT | Performed by: INTERNAL MEDICINE

## 2023-11-09 PROCEDURE — 99214 OFFICE O/P EST MOD 30 MIN: CPT | Performed by: INTERNAL MEDICINE

## 2023-11-09 PROCEDURE — 3078F DIAST BP <80 MM HG: CPT | Performed by: INTERNAL MEDICINE

## 2023-11-09 RX ORDER — ACETAMINOPHEN 500 MG
2000 TABLET ORAL DAILY
COMMUNITY

## 2023-11-09 ASSESSMENT — PATIENT HEALTH QUESTIONNAIRE - PHQ9
1. LITTLE INTEREST OR PLEASURE IN DOING THINGS: SEVERAL DAYS
2. FEELING DOWN, DEPRESSED OR HOPELESS: SEVERAL DAYS
10. IF YOU CHECKED OFF ANY PROBLEMS, HOW DIFFICULT HAVE THESE PROBLEMS MADE IT FOR YOU TO DO YOUR WORK, TAKE CARE OF THINGS AT HOME, OR GET ALONG WITH OTHER PEOPLE: NOT DIFFICULT AT ALL
SUM OF ALL RESPONSES TO PHQ9 QUESTIONS 1 AND 2: 2

## 2023-12-08 DIAGNOSIS — I10 BENIGN HYPERTENSION: ICD-10-CM

## 2023-12-08 RX ORDER — LOSARTAN POTASSIUM 50 MG/1
50 TABLET ORAL DAILY
Qty: 90 TABLET | Refills: 2 | Status: SHIPPED | OUTPATIENT
Start: 2023-12-08

## 2024-02-06 DIAGNOSIS — G47.00 INSOMNIA, UNSPECIFIED TYPE: ICD-10-CM

## 2024-02-06 RX ORDER — LORAZEPAM 1 MG/1
1 TABLET ORAL NIGHTLY
Qty: 30 TABLET | Refills: 0 | Status: SHIPPED | OUTPATIENT
Start: 2024-02-06 | End: 2024-03-05 | Stop reason: SDUPTHER

## 2024-03-05 DIAGNOSIS — G47.00 INSOMNIA, UNSPECIFIED TYPE: ICD-10-CM

## 2024-03-05 RX ORDER — LORAZEPAM 1 MG/1
1 TABLET ORAL NIGHTLY
Qty: 30 TABLET | Refills: 0 | Status: SHIPPED
Start: 2024-03-05 | End: 2024-03-07 | Stop reason: SDUPTHER

## 2024-03-07 ENCOUNTER — TELEPHONE (OUTPATIENT)
Dept: PRIMARY CARE | Facility: CLINIC | Age: 79
End: 2024-03-07
Payer: MEDICARE

## 2024-03-07 DIAGNOSIS — G47.00 INSOMNIA, UNSPECIFIED TYPE: ICD-10-CM

## 2024-03-07 RX ORDER — LORAZEPAM 1 MG/1
1 TABLET ORAL NIGHTLY
Qty: 30 TABLET | Refills: 0 | Status: SHIPPED | OUTPATIENT
Start: 2024-03-07 | End: 2024-04-08 | Stop reason: SDUPTHER

## 2024-03-07 NOTE — TELEPHONE ENCOUNTER
Pt called stated she is having difficulties with prescription of lorazepam it was originally called in to [facility]. Pt needs it sent to  in [place].  Pt stated she called [facility] and they cancelled it, but  doesn't seem to be filling it?    Pt has 2 pills left    Please call pt at   [phone]

## 2024-03-30 DIAGNOSIS — Z12.31 VISIT FOR SCREENING MAMMOGRAM: Primary | ICD-10-CM

## 2024-04-01 NOTE — PROGRESS NOTES
Subjective   Reason for Visit: Mirtha Swann is an 78 y.o. female here for her Subsequent Medicare Assessment, annual physical and follow up           She is going to see ophthalmology in 2024 to see if she needs cataract surgery.  She does not want to do surgery right away     She has good days and bad days but she does not cry.  Her brother calls her every day, he is in a facility. He is younger than her     She is compliant with her systemic medications     She has intermittent burning in the bottom of the feet       HEALTH  PAP 2012 and no longer needs to repeat   Mammo 1/14, 2/15, 5/16, 5/17, 6/18, 6/19, 6/21, 6/22, 7/23, ordered 4/24  BD in 6/19 T-2.7, 6/2021 T-2.5, 7/2023 T-2.3  Colon 12/07 normal   Cologuard 12/18 - and 12/2021 - and no longer needs to repeat   EKG 12/13, 1/16, 3/18, 1/21, 4/24   Urine 2013, 1/15, 1/16, 2/17, 3/18, 3/19   Hep C 3/18-  FLU 10/21, 12/22, 12/23   TDAP 2011, will update with injury   RSV discussed and will consider   Prevnar recommended and will consider   Pneumovax 10/22/2021  Zostavax never and declines  Shingrix recommended and will check local pharmacies   Moderna CVD 3/2021 and 3/2021 booster 11/2021, 10/2022  Ophth She has an appt with [facility] 2024.. No glaucoma /MD. She has bilateral cataracts OU but not ready for surgery. Her mother had AMD.   Copy of her Advanced Directives scanned in her chart 5/2022       Patient Care Team:  Katarzyna Vickers MD as PCP - General  Katarzyna Vickers MD as PCP - [employer]age PCP     Review of Systems  All systems negative except those listed in the HPI      Past Medical, Surgical, and Family History reviewed and updated in chart.  Reviewed all medications by prescribing practitioner or clinical pharmacist   (such as prescriptions, OTCs, herbal therapies and supplements) and documented in the medical record      Objective   Vitals:  Visit Vitals  /70 (BP Location: Left arm, Patient Position: Sitting)    Pulse 84   Temp 36.2 °C (97.2 °F) (Temporal)    Body mass index is 37.93 kg/m².      Physical Exam  Vitals reviewed.   Constitutional:       Appearance: Normal appearance. She is obese.   HENT:      Head: Normocephalic.      Right Ear: Tympanic membrane, ear canal and external ear normal.      Left Ear: Tympanic membrane, ear canal and external ear normal.      Nose: Nose normal.      Mouth/Throat:      Mouth: Mucous membranes are dry.      Pharynx: Oropharynx is clear.   Eyes:      Conjunctiva/sclera: Conjunctivae normal.   Cardiovascular:      Rate and Rhythm: Normal rate and regular rhythm.      Pulses: Normal pulses.      Heart sounds: Normal heart sounds.      Comments: Trace edema BLE  Pulmonary:      Effort: Pulmonary effort is normal.      Breath sounds: Normal breath sounds.   Abdominal:      General: Bowel sounds are normal.      Palpations: Abdomen is soft.   Musculoskeletal:         General: Normal range of motion.      Cervical back: Normal range of motion and neck supple.   Skin:     General: Skin is warm.   Neurological:      General: No focal deficit present.      Mental Status: She is alert and oriented to person, place, and time.   Psychiatric:         Mood and Affect: Mood normal.         Behavior: Behavior normal.         Thought Content: Thought content normal.         Judgment: Judgment normal.       Assessment/Plan   Problem List Items Addressed This Visit       Benign hypertension    Chronic pain of both knees    Dyslipidemia    Insomnia    Osteoporosis of lumbar spine    Tachycardia     Other Visit Diagnoses       Routine general medical examination at health care facility    -  Primary           Subsequent Medicare Assessment, annual physical and follow up completed   Labs ordered     Medicare Wellness completed  -  Discussed healthy diet and regular exercise.    -  Physical exam overall unremarkable. Immunizations reviewed and updated accordingly. Healthy lifestyle choices discussed  (tobacco avoidance, appropriate alcohol use, avoidance of illicit substances).   -  Patient is wearing seatbelt.   -  Screening lab work ordered as indicated.    -  Age appropriate screening tests reviewed with patient.      We spent 15 minutes discussing depression screen and there is nothing found that is of concern for underling depression. The PQH form was filled and the meds reviewed.     --> We spent 15 minutes discussing alcohol use and there are no concerns about overuse. The 15 min was spent in going over any issues of use of alcohol. None      She has grab bars in the shower.  She has not fallen recently and no risk of falls in the house   She has good lighting around the house and functioning smoke detectors.        She is  with 2 daughters. She denies previous history of tobacco use.   She has been retired since 2014.  She had 2 daughters from her first marriage and they have both passed.    The cause of death for her daughter was HTN cardiovascular disease.   She feels she is coping well but she still has some moments where she thinks about her daughter           She has trouble hearing when there is a lot of background noise      Her weight in office today is 221 with BMI of 37.93. We spent 15 minutes discussing diet and weight loss. The struggle of weight loss persists   Recommend she look into a plant based/ whole foods diet   She has lost 5 pounds since last visit. Encouraged her to continue with weight loss      HTN: BP stable   Continue losartan 50 mg daily and atenolol 25 mg QHS.   Recommend she decrease her coffee intake   EKG was normal sinus 4/2024, no LVH or strain pattern noted   She monitors her daily sodium intake. Increase hydration with water   Continue to monitor her BP at home and call my office with elevated readings.      We discussed the patients cardiovascular risk. If needed, lifestyle modifications recommended including: behavioral therapies of nutrition choices, exercise  and eliminate habits that are contributing to their cardiac risk. We agreed to a plan to decrease his cardiovascular risks. Discussed ASA. Reviewed Guidelines and approved recommendations made to patient.   The patient's 10 yr CV risk was estimated at  28.2% 4/2024. We can decrease her risk if we get tighter control of her BP 4/2024      HLD: Labs ordered and we will adjust if indicated  4/2024   She had aching in her legs with atorvastatin   Continue atorvastatin 10 mg M,W,F. Refilled 4/24   Ca cardiac score 6/2022 was 139 all on the right side, small 4 mm nodule right lobe appears benign and is small   Recommend she look into a plant based/ whole foods diet    She has lost 5 pounds since last visit      Familial tremors:   She has a very fine tremor to her right hand  She has had this for years     She has sinus issues and this causes HA at times:    She is drinking warm fluids in the morning and this helps clear the PND   She is using a humidifier in her bedroom at night and continue   She started a gel NS and that seems to help      BLE bilaterally: On exam: trace edema BLE 4/24  Recommend doing ABC exercises with her feet in the morning   Limit sodium intake   Recommend she elevate her legs prn edema      Insomnia and anxiety issues persist:   She is grieving the passing of her daughter, she had COPD and was 57.  She had 2 daughters from her first marriage and they have both passed.   She lost one daughter over 20 years ago  She is the primary caregiver to her  who continues to decline.  He has bladder cancer and finished chemo treatment. He is 81 y/o.   She lost her sister at age 57 the same year her mother passed    Her ruperto keeps her strong   She has friends she goes to lunch with, plays cards with and shops with.   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   She is to avoid long term use of NSAIDs with Cymbalta   She  takes the Ativan at night then she sits and relaxes for a while rocking in the rocking chair    She is no longer taking Ambien   Refills given, OARRS run without flags 4/2/2024      OARRS:  Dr Katarzyna Vickers MD   I have personally reviewed the OARRS report for Mirtha Swann. I have considered the risks of abuse, dependence, addiction and diversion  Is the patient prescribed a combination of a benzodiazepine and opioid?  No  Last Urine Drug Screen / ordered today: No  Recent Results (from the past 8760 hour(s))   OPIATE/OPIOID/BENZO PRESCRIPTION COMPLIANCE    Collection Time: 07/05/23 10:24 AM   Result Value Ref Range    DRUG SCREEN COMMENT URINE SEE BELOW     Creatine, Urine 31.3 mg/dL    Amphetamine Screen, Urine PRESUMPTIVE NEGATIVE NEGATIVE    Barbiturate Screen, Urine PRESUMPTIVE NEGATIVE NEGATIVE    Cannabinoid Screen, Urine PRESUMPTIVE NEGATIVE NEGATIVE    Cocaine Screen, Urine PRESUMPTIVE NEGATIVE NEGATIVE    PCP Screen, Urine PRESUMPTIVE NEGATIVE NEGATIVE    7-Aminoclonazepam <25 Cutoff <25 ng/mL    Alpha-Hydroxyalprazolam <25 Cutoff <25 ng/mL    Alpha-Hydroxymidazolam <25 Cutoff <25 ng/mL    Alprazolam <25 Cutoff <25 ng/mL    Chlordiazepoxide <25 Cutoff <25 ng/mL    Clonazepam <25 Cutoff <25 ng/mL    Diazepam <25 Cutoff <25 ng/mL    Lorazepam 247 (A) Cutoff <25 ng/mL    Midazolam <25 Cutoff <25 ng/mL    Nordiazepam <25 Cutoff <25 ng/mL    Oxazepam <25 Cutoff <25 ng/mL    Temazepam <25 Cutoff <25 ng/mL    Zolpidem <25 Cutoff <25 ng/mL    Zolpidem Metabolite (ZCA) <25 Cutoff <25 ng/mL    6-Acetylmorphine <25 Cutoff <25 ng/mL    Codeine <50 Cutoff <50 ng/mL    Hydrocodone <25 Cutoff <25 ng/mL    Hydromorphone <25 Cutoff <25 ng/mL    Morphine Urine <50 Cutoff <50 ng/mL    Norhydrocodone <25 Cutoff <25 ng/mL    Noroxycodone <25 Cutoff <25 ng/mL    Oxycodone <25 Cutoff <25 ng/mL    Oxymorphone <25 Cutoff <25 ng/mL    Tramadol <50 Cutoff <50 ng/mL    O-Desmethyltramadol <50 Cutoff <50 ng/mL    Fentanyl <2.5  Cutoff<2.5 ng/mL    Norfentanyl <2.5 Cutoff<2.5 ng/mL    METHADONE CONFIRMATION,URINE <25 Cutoff <25 ng/mL    EDDP <25 Cutoff <25 ng/mL     Results are as expected.   Controlled Substance Agreement:  Date of the Last Agreement: 4/2/2024. Urine drug screen 7/5/2023  Reviewed Controlled Substance Agreement including but not limited to the benefits, risks, and alternatives to treatment with a Controlled Substance medication(s).  Benzodiazepines:  What is the patient's goal of therapy? Decrease anxiety   Is this being achieved with current treatment? yes  Activities of Daily Living:   Is your overall impression that this patient is benefiting (symptom reduction outweighs side effects) from benzodiazepine therapy? Yes   1. Physical Functioning: Better  2. Family Relationship: Better  3. Social Relationship: Better  4. Mood: Better  5. Sleep Patterns: Better  6. Overall Function: Better   LE edema and varicose veins: On exam: trace edema BLE 8/2023  She has lipomatous tissue on the inner thighs bilaterally especially around the knees.   We discussed weight loss to decrease LE edema and help with arthritic pain   She has varicose veins, she is not sure she wants to have this corrected   We stopped Maxzide due to LE cramping and up all night.   I referred her to Dr Maxwell for varicose vein evaluation, she did not keep appointment   Recommend wearing compression stockings at 20 - 30 mmHg.   Recommend elevating her legs for 45 minutes every afternoon  She declines medications for LE edema      Arthritis:   I would like for her to avoid Naproxen as much as possible   Continue Cymbalta 30 mg daily   Once she gets moving she does better      Right knee is positive DJD:   This has been worse with weight gain.  Xray bilateral knees 8/2021 showed arthritis only   Recommend using a knee brace when active      Vitamin D def: Vitamin D 33 on labs in 4/2023.   Goal for Vitamin D to be 35 or higher due to history of breast cancer    Continue scripted Vitamin D once weekly.      Vitamin B 12 def: Levels normal in 4/2023  Continue OTC Vitamin D SL 1000 UT daily      She has a history of Left breast Ca+ in 2/15.   Mammo normal in 7/2023 and ordered 4/24   Breast exam normal 4/2024     BD in 7/2023 T-2.3. She declines Fosamax or like medication. She is concerned about the medication because her systemic medications get caught in her throat.   She declines Prolia due to cost.   Continue OTC Caltrate daily, scripted Vitamin D weekly and eat 2 servings of calcium enriched foods daily. Encouraged weight bearing exercise.      Colon in 12/07 normal and no longer needs to repeat   Cologuard in 12/2021 was normal and no longer needs to repeat       Ophth:   She has an appt with [facility] 2024.. No glaucoma /MD. She has bilateral cataracts OU but not ready for surgery. Her mother had AMD.       I spent 15 min with the patient discussing their wishes for end of life choices.   We discussed the need for a Living Will and that wishes should be discussed with Family. The DNR status was reviewed, and we discussed the options of this and, the DNR _CC options as well.   We also went over how important it was to have these choices written down and clear for any surviving family so that their wishes are followed   The patient and I came to to following agreement :   She has a living will and her  is her medical POA, her granddaughter is her secondary.    Copy of her Advanced Directives scanned in her chart 5/2022        Hep C 3/18-  FLU 10/21, 12/22, 12/23   TDAP 2011, will update with injury   RSV discussed and will consider   Prevnar recommended and will consider   Pneumovax 10/22/2021  Zostavax never and declines  Shingrix recommended and will check local pharmacies   Moderna CVD 3/2021 and 3/2021 booster 11/2021, 10/2022   Blood type O negative   Disability placard given 4/29/2022 for 5 years      Some elements in the chart were copied from  Dr. Vickers's last office visit with patient. Notes have been updated where appropriate, and reflect my current medical decision making from today.      RTC in 3 months for follow up and medication refill or sooner if needed   (MCR due 4/2025, last mcr 4/2/2024)      Scribe Attestation  By signing my name below, I, Linda Liam , Scribe   attest that this documentation has been prepared under the direction and in the presence of Katarzyna Vickers MD.

## 2024-04-02 ENCOUNTER — OFFICE VISIT (OUTPATIENT)
Dept: PRIMARY CARE | Facility: CLINIC | Age: 79
End: 2024-04-02
Payer: MEDICARE

## 2024-04-02 VITALS
WEIGHT: 221 LBS | TEMPERATURE: 97.2 F | OXYGEN SATURATION: 98 % | DIASTOLIC BLOOD PRESSURE: 70 MMHG | SYSTOLIC BLOOD PRESSURE: 138 MMHG | HEART RATE: 84 BPM | HEIGHT: 64 IN | BODY MASS INDEX: 37.73 KG/M2

## 2024-04-02 DIAGNOSIS — M25.561 CHRONIC PAIN OF BOTH KNEES: ICD-10-CM

## 2024-04-02 DIAGNOSIS — G89.29 CHRONIC PAIN OF BOTH KNEES: ICD-10-CM

## 2024-04-02 DIAGNOSIS — E78.5 DYSLIPIDEMIA: ICD-10-CM

## 2024-04-02 DIAGNOSIS — M25.562 CHRONIC PAIN OF BOTH KNEES: ICD-10-CM

## 2024-04-02 DIAGNOSIS — F51.01 PRIMARY INSOMNIA: ICD-10-CM

## 2024-04-02 DIAGNOSIS — M81.0 OSTEOPOROSIS OF LUMBAR SPINE: ICD-10-CM

## 2024-04-02 DIAGNOSIS — Z00.00 ROUTINE GENERAL MEDICAL EXAMINATION AT HEALTH CARE FACILITY: Primary | ICD-10-CM

## 2024-04-02 DIAGNOSIS — R00.0 TACHYCARDIA: ICD-10-CM

## 2024-04-02 DIAGNOSIS — I10 BENIGN HYPERTENSION: ICD-10-CM

## 2024-04-02 PROCEDURE — 1124F ACP DISCUSS-NO DSCNMKR DOCD: CPT | Performed by: INTERNAL MEDICINE

## 2024-04-02 PROCEDURE — 1160F RVW MEDS BY RX/DR IN RCRD: CPT | Performed by: INTERNAL MEDICINE

## 2024-04-02 PROCEDURE — G0442 ANNUAL ALCOHOL SCREEN 15 MIN: HCPCS | Performed by: INTERNAL MEDICINE

## 2024-04-02 PROCEDURE — 3078F DIAST BP <80 MM HG: CPT | Performed by: INTERNAL MEDICINE

## 2024-04-02 PROCEDURE — 1157F ADVNC CARE PLAN IN RCRD: CPT | Performed by: INTERNAL MEDICINE

## 2024-04-02 PROCEDURE — 1159F MED LIST DOCD IN RCRD: CPT | Performed by: INTERNAL MEDICINE

## 2024-04-02 PROCEDURE — 1125F AMNT PAIN NOTED PAIN PRSNT: CPT | Performed by: INTERNAL MEDICINE

## 2024-04-02 PROCEDURE — 1170F FXNL STATUS ASSESSED: CPT | Performed by: INTERNAL MEDICINE

## 2024-04-02 PROCEDURE — 99214 OFFICE O/P EST MOD 30 MIN: CPT | Performed by: INTERNAL MEDICINE

## 2024-04-02 PROCEDURE — 3075F SYST BP GE 130 - 139MM HG: CPT | Performed by: INTERNAL MEDICINE

## 2024-04-02 PROCEDURE — 99397 PER PM REEVAL EST PAT 65+ YR: CPT | Performed by: INTERNAL MEDICINE

## 2024-04-02 PROCEDURE — 1036F TOBACCO NON-USER: CPT | Performed by: INTERNAL MEDICINE

## 2024-04-02 PROCEDURE — G0439 PPPS, SUBSEQ VISIT: HCPCS | Performed by: INTERNAL MEDICINE

## 2024-04-02 PROCEDURE — G0446 INTENS BEHAVE THER CARDIO DX: HCPCS | Performed by: INTERNAL MEDICINE

## 2024-04-02 PROCEDURE — 93000 ELECTROCARDIOGRAM COMPLETE: CPT | Performed by: INTERNAL MEDICINE

## 2024-04-02 ASSESSMENT — ENCOUNTER SYMPTOMS
OCCASIONAL FEELINGS OF UNSTEADINESS: 0
LOSS OF SENSATION IN FEET: 0
DEPRESSION: 0

## 2024-04-02 ASSESSMENT — PATIENT HEALTH QUESTIONNAIRE - PHQ9
SUM OF ALL RESPONSES TO PHQ9 QUESTIONS 1 AND 2: 0
1. LITTLE INTEREST OR PLEASURE IN DOING THINGS: NOT AT ALL
2. FEELING DOWN, DEPRESSED OR HOPELESS: NOT AT ALL

## 2024-04-02 ASSESSMENT — ACTIVITIES OF DAILY LIVING (ADL)
GROCERY_SHOPPING: INDEPENDENT
BATHING: INDEPENDENT
TAKING_MEDICATION: INDEPENDENT
DOING_HOUSEWORK: INDEPENDENT
DRESSING: INDEPENDENT
MANAGING_FINANCES: INDEPENDENT

## 2024-04-02 ASSESSMENT — PAIN SCALES - GENERAL: PAINLEVEL: 8

## 2024-04-02 NOTE — PATIENT INSTRUCTIONS
Current weight: 100 kg (221 lb)  Weight change since last visit (-) denotes wt loss -5 lbs   Weight loss needed to achieve BMI 25: 75.7 Lbs  Weight loss needed to achieve BMI 30: 46.6 Lbs    It was a pleasure to see you today.  I would like to remind you about importance of a healthy lifestyle in order to improve your well-being and live longer. Try to engage in physical activities for at least 150 minutes per week.  Eat about 10 servings of fruits and vegetables daily. My advice is 2 servings of fruits and 8 servings of vegetables. For vegetables choose at least half of them green and at least half of them fresh.  Please avoid sugar, salt, fried food and saturated fat.  Weight loss is advised. Target BMI: below 25. Please follow low carbohydrate diet and daily exercise routine for at least 30 minutes. Nutritional consultation is available, please let me know if you are interested. I will be happy to discuss details with you if interested.   Have a good day and stay well.      Obesity is a chronic, relapsing, progressive, treatable, multifactorial, neurobehavioral disease, where in an increase in body fat promotes adipose (fat) tissue dysfunction, as well as biomechanical stress on the body which can result in adverse metabolic, biomechanical, and psychosocial health consequences, in addition to reducing quality of life and lifespan.   Without treatment, obesity is likely to progress and worsen, further increase the patient's risk for numerous health conditions caused by excess adiposity and increasing the risk for premature death.     - Counseling provided on impact of diet, physical activity, stress & sleep in treatment of obesity.    - Counseled on reduced calorie, high protein, high fiber, whole foods diet.  Counseling on benefits of avoidance of frequent intake of processed foods and liquid calories.   - Counseled on behavioral modification strategies and tools to support weight loss.   - Reviewed  pathophysiology of obesity in context of relationship to nutrition, energy balance and environmental factors that influence nutrition and energy balance outcomes.  - Counseled on various behavioral strategies and self monitoring practices to enhance understanding and individual assessment of energy balance, how various changes in types of foods consumed and macronutrient distribution can impact appetite regulation and be used as a tool in treatment of obesity.       The ability to age comfortably depends on how you invest in your body.   Include physical activity in your daily routine. Physical activity increases blood flow to your whole body, including your brain. ...  Eat a healthy diet. A heart-healthy diet may benefit your brain.   Stay mentally active. Be social.   Treat cardiovascular disease.  No smoking, excessive EtOH intake or illicit drug use.

## 2024-04-02 NOTE — PROGRESS NOTES
"Subjective   Reason for Visit: Mirtha Swann is an 78 y.o. female here for a Medicare Wellness visit.     Past Medical, Surgical, and Family History reviewed and updated in chart.    Reviewed all medications by prescribing practitioner or clinical pharmacist (such as prescriptions, OTCs, herbal therapies and supplements) and documented in the medical record.    HPI    Patient Care Team:  Katarzyna Vickers MD as PCP - General  Katarzyna Vickers MD as PCP - [employer]age PCP     Review of Systems    Objective   Vitals:  /70 (BP Location: Left arm, Patient Position: Sitting)   Pulse 84   Temp 36.2 °C (97.2 °F) (Temporal)   Ht 1.626 m (5' 4\")   Wt 100 kg (221 lb)   SpO2 98%   BMI 37.93 kg/m²       Physical Exam    Assessment/Plan   Problem List Items Addressed This Visit    None  Visit Diagnoses     Routine general medical examination at health care facility    -  Primary               "

## 2024-04-08 DIAGNOSIS — G47.00 INSOMNIA, UNSPECIFIED TYPE: ICD-10-CM

## 2024-04-08 RX ORDER — LORAZEPAM 1 MG/1
1 TABLET ORAL NIGHTLY
Qty: 30 TABLET | Refills: 0 | Status: SHIPPED | OUTPATIENT
Start: 2024-04-08 | End: 2024-05-03 | Stop reason: SDUPTHER

## 2024-04-08 NOTE — TELEPHONE ENCOUNTER
Pt stated [facility] is stating they did not receive script for lorazepam - pt called [facility] this morning after I advised we sent it yesterday? Please call and advise

## 2024-05-02 DIAGNOSIS — I10 BENIGN HYPERTENSION: ICD-10-CM

## 2024-05-02 RX ORDER — ATENOLOL 25 MG/1
25 TABLET ORAL DAILY
Qty: 90 TABLET | Refills: 0 | Status: SHIPPED | OUTPATIENT
Start: 2024-05-02

## 2024-05-03 DIAGNOSIS — G47.00 INSOMNIA, UNSPECIFIED TYPE: ICD-10-CM

## 2024-05-03 RX ORDER — LORAZEPAM 1 MG/1
1 TABLET ORAL NIGHTLY
Qty: 30 TABLET | Refills: 0 | Status: SHIPPED | OUTPATIENT
Start: 2024-05-03 | End: 2024-06-04 | Stop reason: SDUPTHER

## 2024-05-25 ENCOUNTER — HOSPITAL ENCOUNTER (EMERGENCY)
Facility: HOSPITAL | Age: 79
Discharge: HOME | End: 2024-05-25
Attending: EMERGENCY MEDICINE
Payer: MEDICARE

## 2024-05-25 ENCOUNTER — APPOINTMENT (OUTPATIENT)
Dept: RADIOLOGY | Facility: HOSPITAL | Age: 79
End: 2024-05-25
Payer: MEDICARE

## 2024-05-25 VITALS
TEMPERATURE: 98 F | SYSTOLIC BLOOD PRESSURE: 158 MMHG | OXYGEN SATURATION: 97 % | RESPIRATION RATE: 16 BRPM | DIASTOLIC BLOOD PRESSURE: 83 MMHG | HEIGHT: 64 IN | WEIGHT: 215 LBS | BODY MASS INDEX: 36.7 KG/M2 | HEART RATE: 63 BPM

## 2024-05-25 DIAGNOSIS — S09.90XA CLOSED HEAD INJURY, INITIAL ENCOUNTER: Primary | ICD-10-CM

## 2024-05-25 PROCEDURE — 72125 CT NECK SPINE W/O DYE: CPT

## 2024-05-25 PROCEDURE — 99284 EMERGENCY DEPT VISIT MOD MDM: CPT | Performed by: EMERGENCY MEDICINE

## 2024-05-25 PROCEDURE — 99285 EMERGENCY DEPT VISIT HI MDM: CPT | Mod: 25

## 2024-05-25 PROCEDURE — 72125 CT NECK SPINE W/O DYE: CPT | Performed by: STUDENT IN AN ORGANIZED HEALTH CARE EDUCATION/TRAINING PROGRAM

## 2024-05-25 PROCEDURE — 70450 CT HEAD/BRAIN W/O DYE: CPT | Performed by: STUDENT IN AN ORGANIZED HEALTH CARE EDUCATION/TRAINING PROGRAM

## 2024-05-25 PROCEDURE — 70450 CT HEAD/BRAIN W/O DYE: CPT

## 2024-05-25 ASSESSMENT — LIFESTYLE VARIABLES
HAVE YOU EVER FELT YOU SHOULD CUT DOWN ON YOUR DRINKING: NO
EVER HAD A DRINK FIRST THING IN THE MORNING TO STEADY YOUR NERVES TO GET RID OF A HANGOVER: NO
HAVE PEOPLE ANNOYED YOU BY CRITICIZING YOUR DRINKING: NO
TOTAL SCORE: 0
EVER FELT BAD OR GUILTY ABOUT YOUR DRINKING: NO

## 2024-05-25 ASSESSMENT — PAIN - FUNCTIONAL ASSESSMENT: PAIN_FUNCTIONAL_ASSESSMENT: 0-10

## 2024-05-25 ASSESSMENT — PAIN SCALES - GENERAL
PAINLEVEL_OUTOF10: 0 - NO PAIN
PAINLEVEL_OUTOF10: 0 - NO PAIN

## 2024-05-25 ASSESSMENT — COLUMBIA-SUICIDE SEVERITY RATING SCALE - C-SSRS
6. HAVE YOU EVER DONE ANYTHING, STARTED TO DO ANYTHING, OR PREPARED TO DO ANYTHING TO END YOUR LIFE?: NO
1. IN THE PAST MONTH, HAVE YOU WISHED YOU WERE DEAD OR WISHED YOU COULD GO TO SLEEP AND NOT WAKE UP?: NO
2. HAVE YOU ACTUALLY HAD ANY THOUGHTS OF KILLING YOURSELF?: NO

## 2024-05-26 NOTE — ED TRIAGE NOTES
Pt here for a fall. Pt sat in a chair with wheels, and slipped, fell backwards landing on her head. Pt hit back of head, states she has a lump on back of head. No thinners, no etoh. No loc.

## 2024-05-26 NOTE — ED PROVIDER NOTES
HPI   Chief Complaint   Patient presents with    Fall       78-year-old male past medical history of hypertension presenting to ED for fall with head injury.  She was sitting in a chair that she is not used to in a gambling alcocer with wheels, though chair rolled out behind her, and she fell onto her bottom and struck the back of her head did not lose consciousness or feel lightheaded dizzy or experience chest pain or palpitations prior to the fall.  She did have some difficulty standing up on her own power was helped up, and since then has been complaining of posterior head pain.  She went to urgent care who referred her to the ED for further evaluation                              Tasha Coma Scale Score: 15                     Patient History   Past Medical History:   Diagnosis Date    Acute maxillary sinusitis, unspecified 01/23/2017    Acute non-recurrent maxillary sinusitis    Acute upper respiratory infection, unspecified 06/04/2019    Acute URI    Body mass index (BMI) 37.0-37.9, adult 04/29/2022    BMI 37.0-37.9, adult    Encounter for screening for cardiovascular disorders     Encounter for screening for cardiovascular disorders    Encounter for screening for malignant neoplasm of colon 02/12/2022    Colon cancer screening    Encounter for therapeutic drug level monitoring 03/06/2019    Medication monitoring encounter    Morbid (severe) obesity due to excess calories (Multi) 04/29/2022    Class 2 severe obesity due to excess calories with serious comorbidity and body mass index (BMI) of 37.0 to 37.9 in adult    Morbid (severe) obesity due to excess calories (Multi) 02/15/2022    Class 2 severe obesity with serious comorbidity and body mass index (BMI) of 37.0 to 37.9 in adult    Morbid (severe) obesity due to excess calories (Multi) 02/15/2022    Class 2 severe obesity with serious comorbidity and body mass index (BMI) of 37.0 to 37.9 in adult    Other conditions influencing health status 12/18/2012     X-Ray    Pain in right knee 12/03/2019    Chronic pain of right knee    Personal history of other diseases of the circulatory system 04/29/2022    History of essential hypertension    Personal history of other diseases of the musculoskeletal system and connective tissue 03/05/2019    History of osteopenia    Personal history of other diseases of the musculoskeletal system and connective tissue 04/01/2020    History of disuse osteoporosis    Personal history of other diseases of the respiratory system 12/03/2019    History of acute bronchitis    Pneumonia, unspecified organism 01/19/2018    Lower lobe pneumonia     Past Surgical History:   Procedure Laterality Date    OTHER SURGICAL HISTORY  01/11/2014    Vaginal Pap smear     Family History   Problem Relation Name Age of Onset    Other (bronchiectasis) Mother      Macular degeneration Mother      Bipolar disorder Father      Other (Suicide completion) Father      Alcohol abuse Sister      Hypothyroidism Sister      Alcohol abuse Daughter       Social History     Tobacco Use    Smoking status: Never    Smokeless tobacco: Never   Vaping Use    Vaping status: Never Used   Substance Use Topics    Alcohol use: Yes     Comment: social    Drug use: Never       Physical Exam   ED Triage Vitals [05/25/24 2039]   Temperature Heart Rate Respirations BP   36.7 °C (98 °F) 75 17 172/88      Pulse Ox Temp Source Heart Rate Source Patient Position   99 % Temporal Monitor Sitting      BP Location FiO2 (%)     Right arm --       Physical Exam  Constitutional:       Appearance: Normal appearance.   HENT:      Head:      Comments: There is swelling to the back of the head on the right-sided posterior parietal aspect of the head no C-spine tenderness     Mouth/Throat:      Mouth: Mucous membranes are moist.   Eyes:      Extraocular Movements: Extraocular movements intact.   Cardiovascular:      Rate and Rhythm: Normal rate and regular rhythm.      Heart sounds: Normal heart sounds. No  murmur heard.  Pulmonary:      Effort: Pulmonary effort is normal. No respiratory distress.      Breath sounds: Normal breath sounds. No wheezing.   Abdominal:      General: There is no distension.      Palpations: Abdomen is soft.      Tenderness: There is no abdominal tenderness. There is no guarding.   Musculoskeletal:      Right lower leg: No edema.      Left lower leg: No edema.   Skin:     General: Skin is warm and dry.   Neurological:      General: No focal deficit present.      Mental Status: She is alert and oriented to person, place, and time.   Psychiatric:         Mood and Affect: Mood normal.         Behavior: Behavior normal.         ED Course & MDM   Diagnoses as of 05/25/24 2218   Closed head injury, initial encounter       Medical Decision Making  Is a 82-year-old female past medical history of atrial fibrillation patient presents ED with mechanical fall this morning with head injury.  She is otherwise well-appearing arrival to the ED no sign of basilar skull fracture clinically, does have bruising to the posterior head.  Will obtain CT scan of head and cervical spine, consider obtaining laboratory studies however I will believe they are necessary as it will bleed this fall to be related to medical cause    CT head of C-spine did not demonstrate any acute intracranial abnormality, no evidence of acute traumatic cervical spine fracture or acute traumatic malalignment.  There is a right posterior scalp soft tissue hematoma noted.    She will be discharged home with outpatient PCP follow-up    Discussed with the attending  Ras Lake DO PGY-4  Emergency Medicine        Procedure  Procedures     Ras Lake DO  Resident  05/27/24 0042       Ras Lake DO  Resident  05/27/24 0042

## 2024-06-01 DIAGNOSIS — M25.561 CHRONIC PAIN OF BOTH KNEES: ICD-10-CM

## 2024-06-01 DIAGNOSIS — M19.90 ARTHRITIS: ICD-10-CM

## 2024-06-01 DIAGNOSIS — G89.29 CHRONIC PAIN OF BOTH KNEES: ICD-10-CM

## 2024-06-01 DIAGNOSIS — M25.562 CHRONIC PAIN OF BOTH KNEES: ICD-10-CM

## 2024-06-01 RX ORDER — DULOXETIN HYDROCHLORIDE 30 MG/1
30 CAPSULE, DELAYED RELEASE ORAL DAILY
Qty: 90 CAPSULE | Refills: 0 | Status: SHIPPED | OUTPATIENT
Start: 2024-06-01

## 2024-06-04 DIAGNOSIS — G47.00 INSOMNIA, UNSPECIFIED TYPE: ICD-10-CM

## 2024-06-04 RX ORDER — LORAZEPAM 1 MG/1
1 TABLET ORAL NIGHTLY
Qty: 30 TABLET | Refills: 0 | Status: SHIPPED | OUTPATIENT
Start: 2024-06-04 | End: 2024-09-02

## 2024-07-02 ENCOUNTER — APPOINTMENT (OUTPATIENT)
Dept: PRIMARY CARE | Facility: CLINIC | Age: 79
End: 2024-07-02
Payer: MEDICARE

## 2024-07-08 ENCOUNTER — LAB (OUTPATIENT)
Dept: LAB | Facility: LAB | Age: 79
End: 2024-07-08
Payer: MEDICARE

## 2024-07-08 DIAGNOSIS — E78.5 DYSLIPIDEMIA: ICD-10-CM

## 2024-07-08 DIAGNOSIS — G47.00 INSOMNIA, UNSPECIFIED TYPE: ICD-10-CM

## 2024-07-08 LAB
ALBUMIN SERPL BCP-MCNC: 3.8 G/DL (ref 3.4–5)
ALP SERPL-CCNC: 52 U/L (ref 33–136)
ALT SERPL W P-5'-P-CCNC: 9 U/L (ref 7–45)
ANION GAP SERPL CALC-SCNC: 12 MMOL/L (ref 10–20)
AST SERPL W P-5'-P-CCNC: 12 U/L (ref 9–39)
BILIRUB SERPL-MCNC: 0.8 MG/DL (ref 0–1.2)
BUN SERPL-MCNC: 21 MG/DL (ref 6–23)
CALCIUM SERPL-MCNC: 9.4 MG/DL (ref 8.6–10.6)
CHLORIDE SERPL-SCNC: 103 MMOL/L (ref 98–107)
CHOLEST SERPL-MCNC: 136 MG/DL (ref 0–199)
CHOLESTEROL/HDL RATIO: 2.4
CO2 SERPL-SCNC: 28 MMOL/L (ref 21–32)
CREAT SERPL-MCNC: 0.87 MG/DL (ref 0.5–1.05)
EGFRCR SERPLBLD CKD-EPI 2021: 69 ML/MIN/1.73M*2
ERYTHROCYTE [DISTWIDTH] IN BLOOD BY AUTOMATED COUNT: 12.8 % (ref 11.5–14.5)
GLUCOSE SERPL-MCNC: 100 MG/DL (ref 74–99)
HCT VFR BLD AUTO: 36.4 % (ref 36–46)
HDLC SERPL-MCNC: 56.4 MG/DL
HGB BLD-MCNC: 11.8 G/DL (ref 12–16)
LDLC SERPL CALC-MCNC: 67 MG/DL
MCH RBC QN AUTO: 29.7 PG (ref 26–34)
MCHC RBC AUTO-ENTMCNC: 32.4 G/DL (ref 32–36)
MCV RBC AUTO: 92 FL (ref 80–100)
NON HDL CHOLESTEROL: 80 MG/DL (ref 0–149)
NRBC BLD-RTO: 0 /100 WBCS (ref 0–0)
PLATELET # BLD AUTO: 276 X10*3/UL (ref 150–450)
POTASSIUM SERPL-SCNC: 5.1 MMOL/L (ref 3.5–5.3)
PROT SERPL-MCNC: 6.7 G/DL (ref 6.4–8.2)
RBC # BLD AUTO: 3.97 X10*6/UL (ref 4–5.2)
SODIUM SERPL-SCNC: 138 MMOL/L (ref 136–145)
TRIGL SERPL-MCNC: 64 MG/DL (ref 0–149)
TSH SERPL-ACNC: 1.33 MIU/L (ref 0.44–3.98)
VLDL: 13 MG/DL (ref 0–40)
WBC # BLD AUTO: 5.2 X10*3/UL (ref 4.4–11.3)

## 2024-07-08 PROCEDURE — 85027 COMPLETE CBC AUTOMATED: CPT

## 2024-07-08 PROCEDURE — 36415 COLL VENOUS BLD VENIPUNCTURE: CPT

## 2024-07-08 PROCEDURE — 80061 LIPID PANEL: CPT

## 2024-07-08 PROCEDURE — 80053 COMPREHEN METABOLIC PANEL: CPT

## 2024-07-08 PROCEDURE — 84443 ASSAY THYROID STIM HORMONE: CPT

## 2024-07-08 RX ORDER — LORAZEPAM 1 MG/1
1 TABLET ORAL NIGHTLY
Qty: 30 TABLET | Refills: 0 | Status: SHIPPED | OUTPATIENT
Start: 2024-07-08 | End: 2024-10-06

## 2024-07-08 NOTE — TELEPHONE ENCOUNTER
Patient was checking on status of refill Lorizapam 1 mg Giant Squaxin. Patient only has one pill left.

## 2024-07-21 NOTE — PROGRESS NOTES
Subjective   Patient ID: Mirtha Swann is a 78 y.o. female who presents for her 3 month follow up multiple medical conditions and medication refill      Her  has been in 3 hospitals since 6/2024  She has 3 sisters around to help her out               HEALTH  PAP 2012 and no longer needs to repeat   Mammo 1/14, 2/15, 5/16, 5/17, 6/18, 6/19, 6/21, 6/22, 7/23, ordered 4/24  BD in 6/19 T-2.7, 6/2021 T-2.5, 7/2023 T-2.3  Colon 12/07 normal   Cologuard 12/18 - and 12/2021 - and no longer needs to repeat   EKG 12/13, 1/16, 3/18, 1/21, 4/24   Urine 2013, 1/15, 1/16, 2/17, 3/18, 3/19   Hep C 3/18-  FLU 10/21, 12/22, 12/23   TDAP 2011, will update with injury   RSV discussed and will consider   Prevnar recommended and will consider   Pneumovax 10/22/2021  Zostavax never and declines  Shingrix recommended and will check local pharmacies   Moderna CVD 3/2021 and 3/2021 booster 11/2021, 10/2022  Ophth She has an appt with [facility] 2024.. No glaucoma /MD. She has bilateral cataracts OU but not ready for surgery. Her mother had AMD.   Copy of her Advanced Directives scanned in her chart 5/2022        Review of Systems  All systems negative except those listed in the HPI       Objective   Visit Vitals  /85 (BP Location: Left arm, Patient Position: Sitting, BP Cuff Size: Adult)   Pulse 87   Temp 36.1 °C (97 °F) (Skin)   Resp 16    Body mass index is 37.42 kg/m².      Physical Exam  Vitals reviewed.   Constitutional:       Appearance: Normal appearance. She is obese.   HENT:      Head: Normocephalic.      Right Ear: Tympanic membrane, ear canal and external ear normal.      Left Ear: Tympanic membrane, ear canal and external ear normal.      Nose: Nose normal.      Mouth/Throat:      Pharynx: Oropharynx is clear.   Eyes:      Conjunctiva/sclera: Conjunctivae normal.   Cardiovascular:      Rate and Rhythm: Normal rate and regular rhythm.      Pulses: Normal pulses.      Heart sounds: Normal heart sounds.    Pulmonary:      Effort: Pulmonary effort is normal.      Breath sounds: Normal breath sounds.   Abdominal:      General: Bowel sounds are normal.      Palpations: Abdomen is soft.   Musculoskeletal:         General: Normal range of motion.      Cervical back: Normal range of motion and neck supple.   Skin:     General: Skin is warm.   Neurological:      General: No focal deficit present.      Mental Status: She is alert and oriented to person, place, and time.      Comments: Familial tremors bilateral hands   Psychiatric:         Mood and Affect: Mood normal.         Behavior: Behavior normal.         Thought Content: Thought content normal.         Judgment: Judgment normal.       Assessment/Plan        Follow up completed  Reviewed her labs from 7/2024   Hgb 11.8 - she does not eat a lot of red meat. Recommend eating red meat once a week  Discussed other foods enriched with iron to eat during the week.     She is  with 2 daughters. She denies previous history of tobacco use.   She has been retired since 2014.  She had 2 daughters from her first marriage and they have both passed.    The cause of death for her daughter was HTN cardiovascular disease.   She feels she is coping well but she still has some moments where she thinks about her daughters       Seen in the ED 5/25/2024 after fall with head injury   She was sitting in a chair that she is not used to in a gambling alcocer with wheels, though chair rolled out behind her, and she fell onto her bottom and struck the back of her head did not lose consciousness or feel lightheaded dizzy   CT head of C-spine 5/24 did not demonstrate any acute intracranial abnormality, no evidence of acute traumatic cervical spine fracture or acute traumatic malalignment. There is a right posterior scalp soft tissue hematoma noted.            She has trouble hearing when there is a lot of background noise      Her weight in office is 218 with BMI of 37.42. We spent 15 minutes  discussing diet and weight loss. The struggle of weight loss persists   Recommend she look into a plant based/ whole foods diet   She has lost 5 pounds since last visit. Encouraged her to continue with weight loss      HTN: BP stable   Continue losartan 50 mg daily and atenolol 25 mg QHS. Refilled 7/2024  Recommend she decrease her coffee intake   EKG was normal sinus 4/2024, no LVH or strain pattern noted   She monitors her daily sodium intake. Increase hydration with water   Continue to monitor her BP at home and call my office with elevated readings.      We discussed the patients cardiovascular risk. If needed, lifestyle modifications recommended including: behavioral therapies of nutrition choices, exercise and eliminate habits that are contributing to their cardiac risk. We agreed to a plan to decrease his cardiovascular risks. Discussed ASA. Reviewed Guidelines and approved recommendations made to patient.   The patient's 10 yr CV risk was estimated at  36.4 % 7/2024     HLD: LDL 67 and HDL 56 on labs in 7/2024   She had aching in her legs with atorvastatin   Continue atorvastatin 10 mg M,W,F. Refilled 7/24  Ca cardiac score 6/2022 was 139 all on the right side, small 4 mm nodule right lobe appears benign and is small   Recommend she look into a plant based/ whole foods diet    She has lost 5 pounds since last visit      Familial tremors bilateral hands:   She has a very fine tremor to her right hand  She has had this for years     She has sinus issues and this causes HA at times:    She is drinking warm fluids in the morning and this helps clear the PND   She is using a humidifier in her bedroom at night and continue   She started a gel NS and that seems to help      BLE bilaterally:    Recommend doing ABC exercises with her feet in the morning   Limit sodium intake   Recommend she elevate her legs prn edema     LE varicose veins:    She has lipomatous tissue on the inner thighs bilaterally especially around  the knees.   We discussed weight loss to decrease LE edema and help with arthritic pain   She has varicose veins, she is not sure she wants to have this corrected   We stopped Maxzide due to LE cramping and up all night.   I referred her to Dr Maxwell for varicose vein evaluation, she did not keep appointment   Recommend wearing compression stockings at 20 - 30 mmHg.   Recommend elevating her legs for 45 minutes every afternoon  She declines medications for LE edema       Insomnia and anxiety issues persist:   She is grieving the passing of her daughter, she had COPD and was 57.  She had 2 daughters from her first marriage and they have both passed.   She lost one daughter over 20 years ago  She is the primary caregiver to her  who continues to decline.  He has bladder cancer and finished chemo treatment. He is 81 y/o.   She lost her sister at age 57 the same year her mother passed    Her ruperto keeps her strong   She has friends she goes to lunch with, plays cards with and shops with.   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 Refilled 7/24  She is to avoid long term use of NSAIDs with Cymbalta   She takes the Ativan at night then she sits and relaxes for a while rocking in the rocking chair    She is no longer taking Ambien   Refills given, OARRS run without flags 7/22/2024     OARRS:  Dr Katarzyna Vickers MD   I have personally reviewed the OARRS report for Mirtha DANIEL Shree. I have considered the risks of abuse, dependence, addiction and diversion  Is the patient prescribed a combination of a benzodiazepine and opioid?  No  Last Urine Drug Screen / ordered today: No    Results are as expected.   Controlled Substance Agreement:  Date of the Last Agreement: 4/2/2024. Urine drug screen 7/5/2023  Reviewed Controlled Substance Agreement including but not limited to the benefits, risks, and alternatives to treatment with a Controlled  Substance medication(s).  Benzodiazepines:  What is the patient's goal of therapy? Decrease anxiety   Is this being achieved with current treatment? yes  Activities of Daily Living:   Is your overall impression that this patient is benefiting (symptom reduction outweighs side effects) from benzodiazepine therapy? Yes   1. Physical Functioning: Better  2. Family Relationship: Better  3. Social Relationship: Better  4. Mood: Better  5. Sleep Patterns: Better  6. Overall Function: Better     Arthritis:   I would like for her to avoid Naproxen as much as possible   Continue Cymbalta 30 mg daily   Once she gets moving she does better      Right knee is positive DJD:   This has been worse with weight gain.  Xray bilateral knees 8/2021 showed arthritis only   Recommend using a knee brace when active      Vitamin D def: Vitamin D 33 on labs in 4/2023.   Goal for Vitamin D to be 35 or higher due to history of breast cancer   Continue scripted Vitamin D once weekly.      Vitamin B 12 def: Levels normal in 4/2023  Continue OTC Vitamin D SL 1000 UT daily      She has a history of Left breast Ca+ in 2/15.   Mammo normal in 7/2023 and ordered 4/24   Breast exam normal 4/2024     BD in 7/2023 T-2.3. She declines Fosamax or like medication. She is concerned about the medication because her systemic medications get caught in her throat.   She declines Prolia due to cost.   Continue OTC Caltrate daily, scripted Vitamin D weekly and eat 2 servings of calcium enriched foods daily. Encouraged weight bearing exercise.      Colon in 12/07 normal and no longer needs to repeat   Cologuard in 12/2021 was normal and no longer needs to repeat       Ophth:   She has an appt with [facility] 2024.. No glaucoma /MD. She has bilateral cataracts OU but not ready for surgery. Her mother had AMD.       She has a living will and her  is her medical POA, her granddaughter is her secondary.    Copy of her Advanced Directives scanned in  her chart 5/2022        Hep C 3/18-  FLU 10/21, 12/22, 12/23   TDAP 2011, will update with injury   RSV discussed and will consider   Prevnar recommended and will consider   Pneumovax 10/22/2021  Zostavax never and declines  Shingrix recommended and will check local pharmacies   Moderna CVD 3/2021 and 3/2021 booster 11/2021, 10/2022   Blood type O negative   Disability placard given 4/29/2022 for 5 years      Some elements in the chart were copied from Dr. Vickers's last office visit with patient. Notes have been updated where appropriate, and reflect my current medical decision making from today.      RTC in 3 months for follow up and medication refill or sooner if needed   (MCR due 4/2025, last mcr 4/2/2024)      Scribe Attestation  By signing my name below, I, Linda Liam , Scribe   attest that this documentation has been prepared under the direction and in the presence of Katarzyna Vickers MD.       yes

## 2024-07-22 ENCOUNTER — APPOINTMENT (OUTPATIENT)
Dept: PRIMARY CARE | Facility: CLINIC | Age: 79
End: 2024-07-22
Payer: MEDICARE

## 2024-07-22 VITALS
WEIGHT: 218 LBS | TEMPERATURE: 97 F | SYSTOLIC BLOOD PRESSURE: 135 MMHG | HEART RATE: 87 BPM | DIASTOLIC BLOOD PRESSURE: 85 MMHG | BODY MASS INDEX: 37.22 KG/M2 | RESPIRATION RATE: 16 BRPM | HEIGHT: 64 IN | OXYGEN SATURATION: 97 %

## 2024-07-22 DIAGNOSIS — Z79.899 HIGH RISK MEDICATION USE: ICD-10-CM

## 2024-07-22 DIAGNOSIS — G89.29 CHRONIC PAIN OF BOTH KNEES: ICD-10-CM

## 2024-07-22 DIAGNOSIS — I83.93 VARICOSE VEINS OF BOTH LOWER EXTREMITIES, UNSPECIFIED WHETHER COMPLICATED: ICD-10-CM

## 2024-07-22 DIAGNOSIS — I10 BENIGN HYPERTENSION: Primary | ICD-10-CM

## 2024-07-22 DIAGNOSIS — F51.01 PRIMARY INSOMNIA: ICD-10-CM

## 2024-07-22 DIAGNOSIS — R00.0 TACHYCARDIA: ICD-10-CM

## 2024-07-22 DIAGNOSIS — G47.00 INSOMNIA, UNSPECIFIED TYPE: ICD-10-CM

## 2024-07-22 DIAGNOSIS — M25.562 CHRONIC PAIN OF BOTH KNEES: ICD-10-CM

## 2024-07-22 DIAGNOSIS — M19.90 ARTHRITIS: ICD-10-CM

## 2024-07-22 DIAGNOSIS — R60.0 LOCALIZED EDEMA: ICD-10-CM

## 2024-07-22 DIAGNOSIS — M25.561 CHRONIC PAIN OF BOTH KNEES: ICD-10-CM

## 2024-07-22 DIAGNOSIS — E78.5 DYSLIPIDEMIA: ICD-10-CM

## 2024-07-22 DIAGNOSIS — M81.0 OSTEOPOROSIS OF LUMBAR SPINE: ICD-10-CM

## 2024-07-22 PROCEDURE — 3075F SYST BP GE 130 - 139MM HG: CPT | Performed by: INTERNAL MEDICINE

## 2024-07-22 PROCEDURE — 1036F TOBACCO NON-USER: CPT | Performed by: INTERNAL MEDICINE

## 2024-07-22 PROCEDURE — 1160F RVW MEDS BY RX/DR IN RCRD: CPT | Performed by: INTERNAL MEDICINE

## 2024-07-22 PROCEDURE — 99214 OFFICE O/P EST MOD 30 MIN: CPT | Performed by: INTERNAL MEDICINE

## 2024-07-22 PROCEDURE — G2211 COMPLEX E/M VISIT ADD ON: HCPCS | Performed by: INTERNAL MEDICINE

## 2024-07-22 PROCEDURE — 1123F ACP DISCUSS/DSCN MKR DOCD: CPT | Performed by: INTERNAL MEDICINE

## 2024-07-22 PROCEDURE — 1159F MED LIST DOCD IN RCRD: CPT | Performed by: INTERNAL MEDICINE

## 2024-07-22 PROCEDURE — 3079F DIAST BP 80-89 MM HG: CPT | Performed by: INTERNAL MEDICINE

## 2024-07-22 PROCEDURE — 1157F ADVNC CARE PLAN IN RCRD: CPT | Performed by: INTERNAL MEDICINE

## 2024-07-22 RX ORDER — LOSARTAN POTASSIUM 50 MG/1
50 TABLET ORAL DAILY
Qty: 90 TABLET | Refills: 3 | Status: SHIPPED | OUTPATIENT
Start: 2024-07-22

## 2024-07-22 RX ORDER — DULOXETIN HYDROCHLORIDE 30 MG/1
30 CAPSULE, DELAYED RELEASE ORAL DAILY
Qty: 90 CAPSULE | Refills: 3 | Status: SHIPPED | OUTPATIENT
Start: 2024-07-22

## 2024-07-22 RX ORDER — LORAZEPAM 1 MG/1
1 TABLET ORAL NIGHTLY
Qty: 30 TABLET | Refills: 2 | Status: SHIPPED | OUTPATIENT
Start: 2024-07-22 | End: 2024-10-20

## 2024-07-22 RX ORDER — ATORVASTATIN CALCIUM 10 MG/1
10 TABLET, FILM COATED ORAL DAILY
Qty: 90 TABLET | Refills: 3 | Status: SHIPPED | OUTPATIENT
Start: 2024-07-22 | End: 2025-07-22

## 2024-07-22 RX ORDER — ATENOLOL 25 MG/1
25 TABLET ORAL DAILY
Qty: 90 TABLET | Refills: 3 | Status: SHIPPED | OUTPATIENT
Start: 2024-07-22

## 2024-07-22 ASSESSMENT — PATIENT HEALTH QUESTIONNAIRE - PHQ9
SUM OF ALL RESPONSES TO PHQ9 QUESTIONS 1 AND 2: 0
2. FEELING DOWN, DEPRESSED OR HOPELESS: NOT AT ALL
1. LITTLE INTEREST OR PLEASURE IN DOING THINGS: NOT AT ALL

## 2024-07-29 ENCOUNTER — HOSPITAL ENCOUNTER (OUTPATIENT)
Dept: RADIOLOGY | Facility: CLINIC | Age: 79
Discharge: HOME | End: 2024-07-29
Payer: MEDICARE

## 2024-07-29 VITALS — WEIGHT: 218 LBS | HEIGHT: 65 IN | BODY MASS INDEX: 36.32 KG/M2

## 2024-07-29 DIAGNOSIS — Z12.31 VISIT FOR SCREENING MAMMOGRAM: ICD-10-CM

## 2024-07-29 PROCEDURE — 77067 SCR MAMMO BI INCL CAD: CPT | Performed by: RADIOLOGY

## 2024-07-29 PROCEDURE — 77067 SCR MAMMO BI INCL CAD: CPT

## 2024-07-29 PROCEDURE — 77063 BREAST TOMOSYNTHESIS BI: CPT | Performed by: RADIOLOGY

## 2024-10-20 NOTE — PROGRESS NOTES
Subjective   Patient ID: Mirtha Swann is a 79 y.o. female who presents for her 3 month follow up multiple medical conditions and medication refill           She fell in the parking lot coming into the clinic today   She tried to step up on the cement and landed on her knees   A lady and her children saw it and were able to help the patient up   Some days her knees are fine and other days she has prescription patches to use and it helps   She takes Tylenol and Ibuprofen but not that often     She has increased stressors and feels this is the reason for her weight loss   She is able to leave her  alone to go to the doctors and shopping.     She is having cataract surgery left eye on 10/30/24 then she will do the right eye in November 2024      HEALTH  PAP 2012 and no longer needs to repeat   Mammo 1/14, 2/15, 5/16, 5/17, 6/18, 6/19, 6/21, 6/22, 7/23, 7/24  BD in 6/19 T-2.7, 6/21 T-2.5, 7/23 T-2.3  Colon 12/07 normal and no longer needs to repeat   Cologuard 12/18 nl, 12/21 nl - and no longer needs to repeat   Ca cardiac score 6/22 was 139 all right side, small 4 mm nodule right lobe appears benign and is small    EKG 12/13, 1/16, 3/18, 1/21, 4/24   Urine 2013, 1/15, 1/16, 2/17, 3/18, 3/19   Hep C 3/18-  FLU 10/21, 12/22, 12/23   TDAP 2011, will update with injury   RSV discussed and will consider   Prevnar recommended and will consider   Pneumovax 10/22/2021  Zostavax never and declines  Shingrix recommended and will check local pharmacies   Moderna CVD 3/21 and 3/21 booster 11/21, 10/22, 1/24   Ophth Seen at [facility] 2024. No glaucoma /MD. She has bilateral cataracts OU but not ready for surgery. Her mother had AMD.   Copy of her Advanced Directives scanned in her chart 5/2022         Review of Systems  All systems negative except those listed in the HPI       Objective   Visit Vitals  /70 (BP Location: Right arm)   Pulse 71   Temp 36.6 °C (97.8 °F)   Resp 18    Body mass index is 35.28  kg/m².      Physical Exam  Vitals reviewed.   Constitutional:       Appearance: Normal appearance. She is obese.   HENT:      Head: Normocephalic.      Right Ear: Tympanic membrane, ear canal and external ear normal.      Left Ear: Tympanic membrane, ear canal and external ear normal.      Nose: Nose normal.      Mouth/Throat:      Pharynx: Oropharynx is clear.   Eyes:      Conjunctiva/sclera: Conjunctivae normal.   Cardiovascular:      Rate and Rhythm: Normal rate and regular rhythm.      Pulses: Normal pulses.      Heart sounds: Normal heart sounds.   Pulmonary:      Effort: Pulmonary effort is normal.      Breath sounds: Normal breath sounds.   Abdominal:      General: Bowel sounds are normal.      Palpations: Abdomen is soft.   Musculoskeletal:         General: Normal range of motion.      Cervical back: Normal range of motion and neck supple.      Comments: scratches to posterior part of both knees   Skin:     General: Skin is warm.   Neurological:      General: No focal deficit present.      Mental Status: She is alert and oriented to person, place, and time.   Psychiatric:         Mood and Affect: Mood normal.         Behavior: Behavior normal.         Thought Content: Thought content normal.         Judgment: Judgment normal.       Assessment/Plan   Problem List Items Addressed This Visit       Benign hypertension - Primary    Chronic pain of both knees    Dyslipidemia    Edema    Insomnia    Osteoporosis of lumbar spine     Other Visit Diagnoses       Age-related cataract of both eyes, unspecified age-related cataract type        surgery 10/24 left and 11/24 right            Follow up completed  Reviewed her labs from 7/24       She is  with 2 daughters. She denies previous history of tobacco use.   She has been retired since 2014.  She had 2 daughters from her first marriage and they have both passed.    The cause of death for one of her daughters was HTN cardiovascular disease.   The other daughter  had COPD and was 57.   She feels she is coping well but she still has some moments where she thinks about her daughters       Fall 10/21/24 : On exam: scratches to posterior part of both knees 10/24  She fell in the parking lot coming into the clinic today   She tried to step up on the cement and landed on her knees   A lady and her children saw it and were able to help the patient up    Some days her knees are fine and other days she has prescription patches to use and it helps   She takes Tylenol and Ibuprofen but not that often   Recommend she continue Tylenol and Ibuprofen short term prn pain.      Seen in the ED 5/25/2024 after fall with head injury   She was sitting in a chair that she is not used to in a gambling alcocer with wheels, though chair rolled out behind her, and she fell onto her bottom and struck the back of her head did not lose consciousness or feel lightheaded dizzy   CT head of C-spine 5/24 did not demonstrate any acute intracranial abnormality, no evidence of acute traumatic cervical spine fracture or acute traumatic malalignment. There is a right posterior scalp soft tissue hematoma noted.            She has trouble hearing when there is a lot of background noise      Her weight in office is 212 with BMI of 35.28. We spent 15 minutes discussing diet and weight loss. The struggle of weight loss persists   Recommend she look into a plant based/ whole foods diet   She has lost 11 pounds since last visit. Encouraged her to continue with weight loss      HTN: BP stable   Continue losartan 50 mg daily and atenolol 25 mg QHS.    Recommend she decrease her coffee intake   EKG was normal sinus 4/2024, no LVH or strain pattern noted   She monitors her daily sodium intake. Increase hydration with water   Continue to monitor her BP at home and call my office with elevated readings.      I have spent 15 min face to face with this patient discussing their cardiac risk   We discussed the patients  cardiovascular risk. If needed, lifestyle modifications recommended including: behavioral therapies of nutrition choices, exercise and eliminate habits that are contributing to their cardiac risk. We agreed to a plan to decrease his cardiovascular risks. Discussed ASA. Reviewed Guidelines and approved recommendations made to patient.   The patient's 10 yr CV risk was estimated at  24.9 %  10/2024     HLD: LDL 67 and HDL 56 on labs in 7/2024   She had aching in her legs with atorvastatin   Continue atorvastatin 10 mg M,W,F.    Ca cardiac score 6/22 was 139 all right side, small 4 mm nodule right lobe appears benign and is small   Recommend she look into a plant based/ whole foods diet    She has lost 5 pounds since last visit      Familial tremors bilateral hands:   She has a very fine tremor to her right hand  She has had this for years     She has sinus issues and this causes HA at times:    She is drinking warm fluids in the morning and this helps clear the PND   She is using a humidifier in her bedroom at night and continue   She started a gel NS and that seems to help      BLE bilaterally:    Recommend doing ABC exercises with her feet in the morning   Limit sodium intake   Recommend she elevate her legs prn edema   She is unable to wear the support stockings      LE varicose veins:    She has lipomatous tissue on the inner thighs bilaterally especially around the knees.   We discussed weight loss to decrease LE edema and help with arthritic pain   She has varicose veins, she is not sure she wants to have this corrected   We stopped Maxzide due to LE cramping and up all night.   I referred her to Dr Maxwell for varicose vein evaluation, she did not keep appointment   Recommend wearing compression stockings at 20 - 30 mmHg.   Recommend elevating her legs for 45 minutes every afternoon  She declines medications for LE edema       Insomnia and anxiety issues persist: She has increased stressors with her husbands  declining health   She had 2 daughters from her first marriage and they have both passed.    The cause of death for one of her daughters was HTN cardiovascular disease.   The other daughter had COPD and was 57.    She is the primary caregiver to her  who continues to decline.  He has bladder cancer and finished chemo treatment. He is 79 y/o.   She lost her sister at age 57 the same year her mother passed    Her ruperto keeps her strong   She has friends she goes to lunch with, plays cards with and shops with.   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 Refilled 7/24  She is to avoid long term use of NSAIDs with Cymbalta   She takes the Ativan at night then she sits and relaxes for a while rocking in the rocking chair    She is no longer taking Ambien   Refills given, OARRS run without flags 10/21/24      OARRS:  Dr Katarzyna Vickers MD   I have personally reviewed the OARRS report for Mirtha Swann. I have considered the risks of abuse, dependence, addiction and diversion  Is the patient prescribed a combination of a benzodiazepine and opioid?  No  Last Urine Drug Screen / ordered today: No     Results are as expected.   Controlled Substance Agreement:  Date of the Last Agreement: 4/2/2024. Urine drug screen 7/5/2023  Reviewed Controlled Substance Agreement including but not limited to the benefits, risks, and alternatives to treatment with a Controlled Substance medication(s).  Benzodiazepines:  What is the patient's goal of therapy? Decrease anxiety   Is this being achieved with current treatment? yes  Activities of Daily Living:   Is your overall impression that this patient is benefiting (symptom reduction outweighs side effects) from benzodiazepine therapy? Yes   1. Physical Functioning: Better  2. Family Relationship: Better  3. Social Relationship: Better  4. Mood: Better  5. Sleep Patterns: Better  6. Overall Function:  Better      Arthritis:   I would like for her to avoid Naproxen as much as possible   Continue Cymbalta 30 mg daily   Once she gets moving she does better      Right knee is positive DJD: She has lost a total of 11 pounds since trying 10/24. Encouraged continued weight loss   This has been worse with weight gain.  Xray bilateral knees 8/2021 showed arthritis only   Recommend using a knee brace when active      Vitamin D def: Vitamin D 33 on labs in 4/2023.   Goal for Vitamin D to be 35 or higher due to history of breast cancer   Discontinue scripted Vitamin D due to concerns for bone loss with higher doses   Recommend taking OTC Vitamin D3 2000 UT daily      Vitamin B 12 def: Levels normal in 4/2023  Continue OTC Vitamin B 12 SL 1000 UT daily      She has a history of Left breast Ca+ in 2/15.   Mammo normal in 7/2024  Breast exam normal 4/2024     BD in 7/2023 T-2.3. She declines Fosamax or like medication.   She is concerned about Fosamax because her medications get caught in her throat.   She declines Prolia due to cost.   Continue OTC Caltrate daily, OTC Vitamin D3 2000 UT daily and eat 2 servings of calcium enriched foods daily.   Discussed importance of weight bearing exercise.      Colon in 12/07 normal and no longer needs to repeat   Cologuard in 12/2021 was normal and no longer needs to repeat       Ophth: She is having cataract surgery left eye on 10/30/24 then she will do the right eye in November 2024   Seen at [facility] 2024. No glaucoma /MD. She has bilateral cataracts OU but not ready for surgery. Her mother had AMD.       She has a living will and her  is her medical POA.  Her granddaughter is her secondary.    Copy of her Advanced Directives scanned in her chart 5/2022        Hep C 3/18-  FLU 10/21, 12/22, 12/23   TDAP 2011, will update with injury   RSV discussed and will consider   Prevnar recommended and will consider   Pneumovax 10/22/2021  Zostavax never and declines  Shingrix  recommended and will check local pharmacies   Moderna CVD 3/21 and 3/21 booster 11/21, 10/22, 1/24   Blood type O negative   Disability placard given 4/29/2022 for 5 years      Some elements in the chart were copied from Dr. Vickers's last office visit with patient. Notes have been updated where appropriate, and reflect my current medical decision making from today.      RTC in 3 months for follow up and medication refill or sooner if needed   (MCR due 4/2025, last mcr 4/2/2024)      Scribe Attestation  By signing my name below, I, Linda Byrd , Scribe   attest that this documentation has been prepared under the direction and in the presence of Katarzyna Vickers MD.

## 2024-10-21 ENCOUNTER — APPOINTMENT (OUTPATIENT)
Dept: PRIMARY CARE | Facility: CLINIC | Age: 79
End: 2024-10-21
Payer: MEDICARE

## 2024-10-21 VITALS
HEART RATE: 71 BPM | OXYGEN SATURATION: 100 % | TEMPERATURE: 97.8 F | WEIGHT: 212 LBS | RESPIRATION RATE: 18 BRPM | SYSTOLIC BLOOD PRESSURE: 118 MMHG | DIASTOLIC BLOOD PRESSURE: 70 MMHG | HEIGHT: 65 IN | BODY MASS INDEX: 35.32 KG/M2

## 2024-10-21 DIAGNOSIS — E78.5 DYSLIPIDEMIA: ICD-10-CM

## 2024-10-21 DIAGNOSIS — M25.561 CHRONIC PAIN OF BOTH KNEES: ICD-10-CM

## 2024-10-21 DIAGNOSIS — H25.9 AGE-RELATED CATARACT OF BOTH EYES, UNSPECIFIED AGE-RELATED CATARACT TYPE: ICD-10-CM

## 2024-10-21 DIAGNOSIS — G47.00 INSOMNIA, UNSPECIFIED TYPE: ICD-10-CM

## 2024-10-21 DIAGNOSIS — M81.0 OSTEOPOROSIS OF LUMBAR SPINE: ICD-10-CM

## 2024-10-21 DIAGNOSIS — F51.01 PRIMARY INSOMNIA: ICD-10-CM

## 2024-10-21 DIAGNOSIS — R60.0 LOCALIZED EDEMA: ICD-10-CM

## 2024-10-21 DIAGNOSIS — I10 BENIGN HYPERTENSION: Primary | ICD-10-CM

## 2024-10-21 DIAGNOSIS — M25.562 CHRONIC PAIN OF BOTH KNEES: ICD-10-CM

## 2024-10-21 DIAGNOSIS — G89.29 CHRONIC PAIN OF BOTH KNEES: ICD-10-CM

## 2024-10-21 PROCEDURE — 1160F RVW MEDS BY RX/DR IN RCRD: CPT | Performed by: INTERNAL MEDICINE

## 2024-10-21 PROCEDURE — G2211 COMPLEX E/M VISIT ADD ON: HCPCS | Performed by: INTERNAL MEDICINE

## 2024-10-21 PROCEDURE — 1157F ADVNC CARE PLAN IN RCRD: CPT | Performed by: INTERNAL MEDICINE

## 2024-10-21 PROCEDURE — 99214 OFFICE O/P EST MOD 30 MIN: CPT | Performed by: INTERNAL MEDICINE

## 2024-10-21 PROCEDURE — 3078F DIAST BP <80 MM HG: CPT | Performed by: INTERNAL MEDICINE

## 2024-10-21 PROCEDURE — 1036F TOBACCO NON-USER: CPT | Performed by: INTERNAL MEDICINE

## 2024-10-21 PROCEDURE — 3074F SYST BP LT 130 MM HG: CPT | Performed by: INTERNAL MEDICINE

## 2024-10-21 PROCEDURE — 1123F ACP DISCUSS/DSCN MKR DOCD: CPT | Performed by: INTERNAL MEDICINE

## 2024-10-21 PROCEDURE — 1159F MED LIST DOCD IN RCRD: CPT | Performed by: INTERNAL MEDICINE

## 2024-10-21 RX ORDER — LORAZEPAM 1 MG/1
1 TABLET ORAL NIGHTLY
Qty: 30 TABLET | Refills: 2 | Status: SHIPPED | OUTPATIENT
Start: 2024-10-21 | End: 2025-01-19

## 2024-11-20 DIAGNOSIS — M81.0 OSTEOPOROSIS OF LUMBAR SPINE: Primary | ICD-10-CM

## 2025-02-02 NOTE — PROGRESS NOTES
Subjective   Patient ID: Mirtha Swann is a 79 y.o. female who presents for her 3 month follow up multiple medical conditions and medication refill        She would like the influenza vaccine while she is in the office today     She is taking Vitamin B 12 daily   She is not taking Vitamin D daily     She is not experiencing palpitations. She has anxiety   Her  is doing Keytruda, he had his first dose last Wednesday  If Keytruda does not work, there will be no other options for treatment for him.   He is getting cortisone injections in his shoulders and nerve blocks.     She admits she is not staying well hydrated throughout the day   She is eating well, her appetite is normal  She has no GI issues to report       HEALTH  PAP 2012 and no longer needs to repeat   Mammo 5/17, 6/18, 6/19, 6/21, 6/22, 7/23, 7/24  BD in 6/19 T-2.7, 6/21 T-2.5, 7/23 T-2.3  Colon 12/07 normal and no longer needs to repeat   Cologuard 12/18 nl, 12/21 nl - and no longer needs to repeat   Ca cardiac score 6/22 was 139 all right side, small 4 mm nodule right lobe appears benign and is small    EKG 12/13, 1/16, 3/18, 1/21, 4/24   Urine 1/15, 1/16, 2/17, 3/18, 3/19   Hep C 3/18-  FLU 10/21, 12/22, 12/23, 2/25  TDAP 2011, will update with injury   RSV discussed and will consider   Prevnar 20 recommended and will consider   Pneumovax 10/22/2021  Zostavax never and declines  Shingrix recommended and will check local pharmacies   Moderna CVD 3/21 and 3/21 booster 11/21, 10/22, 1/24   Ophth Seen at [facility] 2024. No glaucoma /MD. She has bilateral cataracts OU but not ready for surgery. Her mother had AMD.   Copy of her Advanced Directives scanned in her chart 5/2022         Review of Systems  All systems negative except those listed in the HPI      Objective   Visit Vitals  /70 (BP Location: Left arm, Patient Position: Sitting, BP Cuff Size: Adult)   Pulse 60    Body mass index is 34.95 kg/m².      Physical Exam  Vitals  reviewed.   Constitutional:       Appearance: Normal appearance. She is obese.   HENT:      Head: Normocephalic.      Right Ear: Tympanic membrane, ear canal and external ear normal.      Left Ear: Tympanic membrane, ear canal and external ear normal.      Nose: Nose normal.      Mouth/Throat:      Mouth: Mucous membranes are dry.      Pharynx: Oropharynx is clear.   Eyes:      Conjunctiva/sclera: Conjunctivae normal.   Cardiovascular:      Rate and Rhythm: Normal rate and regular rhythm.      Pulses: Normal pulses.      Heart sounds: Normal heart sounds.   Pulmonary:      Effort: Pulmonary effort is normal.      Breath sounds: Normal breath sounds.   Abdominal:      General: Bowel sounds are normal.      Palpations: Abdomen is soft.   Musculoskeletal:         General: Normal range of motion.      Cervical back: Normal range of motion and neck supple.   Skin:     General: Skin is warm.   Neurological:      General: No focal deficit present.      Mental Status: She is alert and oriented to person, place, and time.   Psychiatric:         Mood and Affect: Mood normal.         Behavior: Behavior normal.         Thought Content: Thought content normal.         Judgment: Judgment normal.       Assessment/Plan   Problem List Items Addressed This Visit       Benign hypertension - Primary    Chronic atrial fibrillation (Multi)    Dyslipidemia    Osteoporosis of lumbar spine    Tachycardia    Vitamin D deficiency     Other Visit Diagnoses       Encounter for immunization        Relevant Orders    Flu vaccine, trivalent, preservative free, age 6 months and greater (Fluarix/Fluzone/Flulaval) (Completed)    Asymptomatic menopausal state        Relevant Orders    XR DEXA bone density    Visit for screening mammogram        Relevant Orders    BI mammo bilateral screening tomosynthesis    Mild anemia                Follow up completed  Reviewed her labs from 7/24   Labs ordered - she will have these drawn prior to her next visit  which is her MCR. She is aware these are fasting labs     She is  with 2 daughters. She denies previous history of tobacco use.   She has been retired since 2014.  She had 2 daughters from her first marriage and they have both passed.    The cause of death for one of her daughters was HTN cardiovascular disease.   The other daughter had COPD and was 57.   She feels she is coping well but she still has some moments where she thinks about her daughters          Seen in the ED 5/25/2024 after fall with head injury   She was sitting in a chair that she is not used to in a gambling alcocer with wheels, though chair rolled out behind her, and she fell onto her bottom and struck the back of her head did not lose consciousness or feel lightheaded dizzy   CT head of C-spine 5/24 did not demonstrate any acute intracranial abnormality, no evidence of acute traumatic cervical spine fracture or acute traumatic malalignment. There is a right posterior scalp soft tissue hematoma noted.            She has trouble hearing when there is a lot of background noise      Her weight in office is 210 with BMI of 34.95. We spent 15 minutes discussing diet and weight loss. The struggle of weight loss persists   Recommend she look into a plant based/ whole foods diet   She has lost 13 pounds since trying. Encouraged her to continue with weight loss   She needs to increase hydration throughout the day with water      HTN: BP stable   Continue losartan 50 mg daily and atenolol 25 mg QHS.    Recommend she decrease her coffee intake   EKG was normal sinus 4/2024, no LVH or strain pattern noted   She monitors her daily sodium intake. Increase hydration with water   Continue to monitor her BP at home and call my office with elevated readings.      I have spent 15 min face to face with this patient discussing their cardiac risk   We discussed the patients cardiovascular risk. If needed, lifestyle modifications recommended including: behavioral  therapies of nutrition choices, exercise and eliminate habits that are contributing to their cardiac risk. We agreed to a plan to decrease his cardiovascular risks. Discussed ASA. Reviewed Guidelines and approved recommendations made to patient.   The patient's 10 yr CV risk was estimated at  24.9 %  1/25     HLD: LDL 67 and HDL 56 on labs in 7/2024. Labs ordered and we will adjust if indicated 2/25   She had aching in her legs with atorvastatin   Continue atorvastatin 10 mg M,W,F.    Ca cardiac score 6/22 was 139 right side, small 4 mm nodule right lobe appears benign and is small   Recommend she look into a plant based/ whole foods diet       Familial tremors bilateral hands:   She has a very fine tremor to her right hand  She has had this for years     She has sinus issues and this causes HA at times:    She is drinking warm fluids in the morning and this helps clear the PND   She is using a humidifier in her bedroom at night and continue   She started a gel NS and that seems to help      BLE bilaterally:    Limit sodium intake    Recommend doing ABC exercises with her feet in the morning   Recommend she elevate her legs prn edema   She is unable to wear the support stockings      LE varicose veins:    She has lipomatous tissue on the inner thighs bilaterally especially around the knees.   We discussed weight loss to decrease LE edema and help with arthritic pain   She has varicose veins, she is not sure she wants to have this corrected   We stopped Maxzide due to LE cramping and up all night.   I referred her to Dr Maxwell for varicose vein evaluation, she did not keep appointment   Recommend wearing compression stockings at 20 - 30 mmHg.   Recommend elevating her legs for 45 minutes every afternoon  She declines medications for LE edema       Insomnia and anxiety issues persist: Her  is doing Keytruda, he had his first dose last Wednesday. Her  is 84 y/o. If Keytruda does not work, there will be no  other options for treatment for him. He is getting cortisone injections in his shoulders and nerve blocks.    She has increased stressors with her husbands declining health   He has bladder cancer and finished chemo treatment. She is primary caregiver   She had 2 daughters from her first marriage and they have both passed.    The cause of death for one of her daughters was HTN cardiovascular disease.   The other daughter had COPD and was 57.    She lost her sister at age 57 the same year her mother passed    Her ruperto keeps her strong   She has friends she goes to lunch with, plays cards with and shops with.   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    She is to avoid long term use of NSAIDs with Cymbalta   She takes Ativan at night then she sits and relaxes while rocking in the rocking chair    She is no longer taking Ambien   Refills given, OARRS run without flags 2/3/25    She has continuous low risk for abuse of medication. She has good response with medications still. There has been no escalation of dosage with her medications   I reviewed the reasons patient is on these medications and have found that this course of treatment for her is appropriate 2/25  Reviewed Controlled Substance Agreement 2/25     OARRS:  Dr Katarzyna Vickers MD   I have personally reviewed the OARRS report for Mirtha MARÍA Swann. I have considered the risks of abuse, dependence, addiction and diversion  Is the patient prescribed a combination of a benzodiazepine and opioid?  No  Last Urine Drug Screen / ordered today: No     Results are as expected.   Controlled Substance Agreement:  Date of the Last Agreement: 4/2/2024. Urine drug screen 7/5/2023  Reviewed Controlled Substance Agreement including but not limited to the benefits, risks, and alternatives to treatment with a Controlled Substance medication(s).  Benzodiazepines:  What is the patient's goal of  therapy? Decrease anxiety   Is this being achieved with current treatment? yes  Activities of Daily Living:   Is your overall impression that this patient is benefiting (symptom reduction outweighs side effects) from benzodiazepine therapy? Yes   1. Physical Functioning: Better  2. Family Relationship: Better  3. Social Relationship: Better  4. Mood: Better  5. Sleep Patterns: Better  6. Overall Function: Better      Arthritis:   I would like for her to avoid Naproxen as much as possible   Continue Cymbalta 30 mg daily   Once she gets moving she does better      Right knee is positive DJD:     This has been worse with weight gain.  Xray bilateral knees 8/2021 showed arthritis only   Recommend using a knee brace when active      Vitamin D def: Vitamin D 33 on labs in 4/2023.   Goal for Vitamin D to be 35 or higher due to history of breast cancer   Discontinue scripted Vitamin D due to concerns for bone loss with higher doses   Continue OTC Vitamin D3 2000 UT daily      Vitamin B 12 def: Levels normal in 4/2023  Continue OTC Vitamin B 12 SL 1000 UT daily   Discussed foods rich in Vitamin B 12      She has a history of Left breast Ca+ in 2/15.   Mammo normal in 7/2024 and ordered 2/25   Breast exam normal 4/2024     BD in 7/2023 T-2.3. Long discussion explaining medications that will help with her bone density (Prolia, Fosamax and Reclast) along with their possible side effects 2/25.  She declines Fosamax or like medication. She declines Prolia due to cost.    She is concerned about Fosamax because her medications get caught in her throat.   Continue OTC Caltrate daily, OTC Vitamin D3 2000 UT daily and eat 2 servings of calcium enriched foods daily. She would like to repeat the bone density first. Orders placed for bone density 2/25  Discussed importance of weight bearing exercise.      Colon in 12/07 normal and no longer needs to repeat   Cologuard in 12/2021 was normal and no longer needs to repeat       Ophth: She  is having cataract surgery left eye on 10/30/24 then she will do the right eye in November 2024   Seen at [facility] 2024. No glaucoma /MD. She has bilateral cataracts OU but not ready for surgery. Her mother had AMD.       She has a living will and her  is her medical POA.  Her granddaughter is her secondary.    Copy of her Advanced Directives scanned in her chart 5/2022        Hep C 3/18-  FLU 10/21, 12/22, 12/23, 2/25   TDAP 2011, will update with injury   RSV discussed and will consider   Prevnar recommended and will consider   Pneumovax 10/22/2021  Zostavax never and declines  Shingrix recommended and will check local pharmacies   Moderna CVD 3/21 and 3/21 booster 11/21, 10/22, 1/24   Blood type O negative   Disability placard given 4/22 for 5 years      Some elements in the chart were copied from Dr. Vickers's last office visit with patient. Notes have been updated where appropriate, and reflect my current medical decision making from today.      RTC in 3 months for MCR and medication refill or sooner if needed   (MCR due 4/2025, last mcr 4/2/2024)      Scribe Attestation  By signing my name below, I, Linda Wheeler , Scribe   attest that this documentation has been prepared under the direction and in the presence of Katarzyna Vickers MD.

## 2025-02-03 ENCOUNTER — APPOINTMENT (OUTPATIENT)
Dept: PRIMARY CARE | Facility: CLINIC | Age: 80
End: 2025-02-03
Payer: MEDICARE

## 2025-02-03 VITALS
DIASTOLIC BLOOD PRESSURE: 70 MMHG | HEART RATE: 60 BPM | HEIGHT: 65 IN | OXYGEN SATURATION: 98 % | WEIGHT: 210 LBS | SYSTOLIC BLOOD PRESSURE: 134 MMHG | BODY MASS INDEX: 34.99 KG/M2

## 2025-02-03 DIAGNOSIS — G47.00 INSOMNIA, UNSPECIFIED TYPE: ICD-10-CM

## 2025-02-03 DIAGNOSIS — E78.5 DYSLIPIDEMIA: ICD-10-CM

## 2025-02-03 DIAGNOSIS — M81.0 OSTEOPOROSIS OF LUMBAR SPINE: ICD-10-CM

## 2025-02-03 DIAGNOSIS — D64.9 MILD ANEMIA: ICD-10-CM

## 2025-02-03 DIAGNOSIS — I10 BENIGN HYPERTENSION: Primary | ICD-10-CM

## 2025-02-03 DIAGNOSIS — Z12.31 VISIT FOR SCREENING MAMMOGRAM: ICD-10-CM

## 2025-02-03 DIAGNOSIS — Z23 ENCOUNTER FOR IMMUNIZATION: ICD-10-CM

## 2025-02-03 DIAGNOSIS — I48.20 CHRONIC ATRIAL FIBRILLATION (MULTI): ICD-10-CM

## 2025-02-03 DIAGNOSIS — E55.9 VITAMIN D DEFICIENCY: ICD-10-CM

## 2025-02-03 DIAGNOSIS — Z78.0 ASYMPTOMATIC MENOPAUSAL STATE: ICD-10-CM

## 2025-02-03 DIAGNOSIS — R00.0 TACHYCARDIA: ICD-10-CM

## 2025-02-03 PROCEDURE — G0008 ADMIN INFLUENZA VIRUS VAC: HCPCS | Performed by: INTERNAL MEDICINE

## 2025-02-03 PROCEDURE — 90656 IIV3 VACC NO PRSV 0.5 ML IM: CPT | Performed by: INTERNAL MEDICINE

## 2025-02-03 PROCEDURE — 1123F ACP DISCUSS/DSCN MKR DOCD: CPT | Performed by: INTERNAL MEDICINE

## 2025-02-03 PROCEDURE — 1160F RVW MEDS BY RX/DR IN RCRD: CPT | Performed by: INTERNAL MEDICINE

## 2025-02-03 PROCEDURE — 1036F TOBACCO NON-USER: CPT | Performed by: INTERNAL MEDICINE

## 2025-02-03 PROCEDURE — 1157F ADVNC CARE PLAN IN RCRD: CPT | Performed by: INTERNAL MEDICINE

## 2025-02-03 PROCEDURE — 3078F DIAST BP <80 MM HG: CPT | Performed by: INTERNAL MEDICINE

## 2025-02-03 PROCEDURE — 1159F MED LIST DOCD IN RCRD: CPT | Performed by: INTERNAL MEDICINE

## 2025-02-03 PROCEDURE — G2211 COMPLEX E/M VISIT ADD ON: HCPCS | Performed by: INTERNAL MEDICINE

## 2025-02-03 PROCEDURE — 99214 OFFICE O/P EST MOD 30 MIN: CPT | Performed by: INTERNAL MEDICINE

## 2025-02-03 PROCEDURE — 3075F SYST BP GE 130 - 139MM HG: CPT | Performed by: INTERNAL MEDICINE

## 2025-02-03 RX ORDER — LORAZEPAM 1 MG/1
1 TABLET ORAL NIGHTLY
Qty: 30 TABLET | Refills: 2 | Status: SHIPPED | OUTPATIENT
Start: 2025-02-03 | End: 2025-05-04

## 2025-02-03 ASSESSMENT — PATIENT HEALTH QUESTIONNAIRE - PHQ9
SUM OF ALL RESPONSES TO PHQ9 QUESTIONS 1 AND 2: 0
2. FEELING DOWN, DEPRESSED OR HOPELESS: NOT AT ALL
1. LITTLE INTEREST OR PLEASURE IN DOING THINGS: NOT AT ALL
SUM OF ALL RESPONSES TO PHQ9 QUESTIONS 1 AND 2: 0
1. LITTLE INTEREST OR PLEASURE IN DOING THINGS: NOT AT ALL
2. FEELING DOWN, DEPRESSED OR HOPELESS: NOT AT ALL

## 2025-04-05 LAB
25(OH)D3+25(OH)D2 SERPL-MCNC: 43 NG/ML (ref 30–100)
ALBUMIN SERPL-MCNC: 3.9 G/DL (ref 3.6–5.1)
ALP SERPL-CCNC: 46 U/L (ref 37–153)
ALT SERPL-CCNC: 10 U/L (ref 6–29)
ANION GAP SERPL CALCULATED.4IONS-SCNC: 7 MMOL/L (CALC) (ref 7–17)
AST SERPL-CCNC: 14 U/L (ref 10–35)
BILIRUB SERPL-MCNC: 1.1 MG/DL (ref 0.2–1.2)
BUN SERPL-MCNC: 25 MG/DL (ref 7–25)
CALCIUM SERPL-MCNC: 9.1 MG/DL (ref 8.6–10.4)
CHLORIDE SERPL-SCNC: 103 MMOL/L (ref 98–110)
CHOLEST SERPL-MCNC: 143 MG/DL
CHOLEST/HDLC SERPL: 2.3 (CALC)
CO2 SERPL-SCNC: 28 MMOL/L (ref 20–32)
CREAT SERPL-MCNC: 0.88 MG/DL (ref 0.6–1)
EGFRCR SERPLBLD CKD-EPI 2021: 67 ML/MIN/1.73M2
ERYTHROCYTE [DISTWIDTH] IN BLOOD BY AUTOMATED COUNT: 12.9 % (ref 11–15)
FERRITIN SERPL-MCNC: 296 NG/ML (ref 16–288)
GLUCOSE SERPL-MCNC: 94 MG/DL (ref 65–99)
HCT VFR BLD AUTO: 38.7 % (ref 35–45)
HDLC SERPL-MCNC: 62 MG/DL
HGB BLD-MCNC: 12.6 G/DL (ref 11.7–15.5)
IRON SATN MFR SERPL: 41 % (CALC) (ref 16–45)
IRON SERPL-MCNC: 93 MCG/DL (ref 45–160)
LDLC SERPL CALC-MCNC: 68 MG/DL (CALC)
MCH RBC QN AUTO: 30.4 PG (ref 27–33)
MCHC RBC AUTO-ENTMCNC: 32.6 G/DL (ref 32–36)
MCV RBC AUTO: 93.3 FL (ref 80–100)
NONHDLC SERPL-MCNC: 81 MG/DL (CALC)
PLATELET # BLD AUTO: 263 THOUSAND/UL (ref 140–400)
PMV BLD REES-ECKER: 10.5 FL (ref 7.5–12.5)
POTASSIUM SERPL-SCNC: 5.2 MMOL/L (ref 3.5–5.3)
PROT SERPL-MCNC: 6.2 G/DL (ref 6.1–8.1)
RBC # BLD AUTO: 4.15 MILLION/UL (ref 3.8–5.1)
SODIUM SERPL-SCNC: 138 MMOL/L (ref 135–146)
TIBC SERPL-MCNC: 227 MCG/DL (CALC) (ref 250–450)
TRIGL SERPL-MCNC: 52 MG/DL
TSH SERPL-ACNC: 1.84 MIU/L (ref 0.4–4.5)
WBC # BLD AUTO: 4.8 THOUSAND/UL (ref 3.8–10.8)

## 2025-04-21 NOTE — PROGRESS NOTES
Subjective   Reason for Visit: Mirtha Swann is an 79 y.o. female here for her Subsequent Medicare Assessment, annual physical and follow up with medication refill          Her  is getting his 3rd Keytruda treatment soon. He is doing well except fatigued.   She [place]     She hurts when she first gets up and starts moving then the pain eases off         HEALTH  PAP 2012 and no longer needs to repeat   Mammo 6/19, 6/21, 6/22, 7/23, 7/24, ordered 4/25  BD in 6/19 T-2.7, 6/21 T-2.5, 7/23 T-2.3, ordered 4/25  Colon 12/07 normal and no longer needs to repeat   Cologuard 12/18 nl, 12/21 nl - and no longer needs to repeat   Ca cardiac score 6/22 was 139 all right side, small 4 mm nodule right lobe appears benign and is small    EKG 12/13, 1/16, 3/18, 1/21, 4/24   Urine 1/15, 1/16, 2/17, 3/18, 3/19   Hep C 3/18-  FLU 10/21, 12/22, 12/23, 2/25  TDAP 2011, will update with injury   RSV discussed and will consider   Prevnar 20 recommended and will consider   Pneumovax 10/22/2021  Shingrix recommended and will check local pharmacies   SARS-CoV-2- 3/21, 3/21, 11/21, 10/22, 1/24   Ophth Seen at [facility] 2024. No glaucoma /MD. She had cataract extraction bilaterally in 2024. Her mother had AMD.    Copy of her Advanced Directives scanned in her chart 5/2022        Patient Care Team:  Katarzyna Vickers MD as PCP - General  Katarzyna Vickers MD as PCP - [employer] PCP     Review of Systems  All systems negative except those listed in the HPI      Past Medical, Surgical, and Family History reviewed and updated in chart.  Reviewed all medications by prescribing practitioner or clinical pharmacist   (such as prescriptions, OTCs, herbal therapies and supplements) and documented in the medical record       Objective   Vitals:  Visit Vitals  /70 (BP Location: Left arm, Patient Position: Sitting)   Pulse 74    Body mass index is 36.05 kg/m².       Physical Exam  Vitals reviewed.   Constitutional:       Appearance: Normal appearance. She is obese.   HENT:      Head: Normocephalic.      Right Ear: Tympanic membrane, ear canal and external ear normal.      Left Ear: Tympanic membrane, ear canal and external ear normal.      Nose: Nose normal.      Mouth/Throat:      Mouth: Mucous membranes are dry.      Pharynx: Oropharynx is clear.   Eyes:      Conjunctiva/sclera: Conjunctivae normal.   Cardiovascular:      Rate and Rhythm: Normal rate and regular rhythm.      Pulses: Normal pulses.      Heart sounds: Normal heart sounds.   Pulmonary:      Effort: Pulmonary effort is normal.      Breath sounds: Normal breath sounds.   Abdominal:      General: Bowel sounds are normal.      Palpations: Abdomen is soft.   Musculoskeletal:         General: Normal range of motion.      Cervical back: Normal range of motion and neck supple.      Comments: She has lipomatous tissue on the inner thighs bilaterally especially around the knees.    Skin:     General: Skin is warm.   Neurological:      General: No focal deficit present.      Mental Status: She is alert and oriented to person, place, and time.   Psychiatric:         Mood and Affect: Mood normal.         Behavior: Behavior normal.         Thought Content: Thought content normal.         Judgment: Judgment normal.       Assessment & Plan       Problem List Items Addressed This Visit       Benign hypertension    Chronic atrial fibrillation (Multi)    Chronic pain of both knees    Dyslipidemia    Insomnia    Osteoporosis of lumbar spine     Other Visit Diagnoses         Routine general medical examination at health care facility    -  Primary    Relevant Orders    1 Year Follow Up In [facility] - PCP - Wellness Exam           Subsequent Medicare Assessment, annual physical and follow up completed   Reviewed her labs from 4/25      Medicare Wellness completed  -  Discussed healthy diet and regular exercise.    -   Physical exam overall unremarkable. Immunizations reviewed and updated accordingly. Healthy lifestyle choices discussed (tobacco avoidance, appropriate alcohol use, avoidance of illicit substances).   -  Patient is wearing seatbelt.   -  Screening lab work ordered as indicated.    -  Age appropriate screening tests reviewed with patient.       We spent 15 minutes discussing depression screen and there is nothing found that is of concern for underling depression. The PQH form was filled and the meds reviewed.  No depression to report      We spent 5 minutes discussing alcohol use and there are no concerns about overuse. The 5 min was spent in going over any issues of use of alcohol. None      She has grab bars in the shower.  She has not fallen recently and no risk of falls in the house   She has good lighting around the house and functioning smoke detectors.    She is  with 2 daughters. She denies previous history of tobacco use.   She had 2 daughters from her first marriage and they have both passed.    The cause of death for one of her daughters was HTN cardiovascular disease.   The other daughter had COPD and was 57. She feels she is coping well but she still has some moments where she thinks about her daughters     She has been retired since 2014. Her  has bladder cancer and doing tx   She is primary caregiver to her       She has trouble hearing when there is a lot of background noise      Her weight in office is 210 with BMI of 36.05. We spent 15 minutes discussing diet and weight loss. The struggle of weight loss persists  I would like to see her BMI below 30.   Recommend she look into a plant based/ whole foods diet      HTN: BP stable   Continue losartan 50 mg daily and atenolol 25 mg QHS.    Recommend she decrease her coffee intake   EKG normal sinus 4/2024, no LVH or strain pattern noted   She monitors her daily sodium intake. Increase hydration with water   Continue to monitor her BP  at home and call my office with elevated readings.      I have spent 15 min face to face with this patient discussing their cardiac risk   We discussed the patients cardiovascular risk. If needed, lifestyle modifications recommended including: behavioral therapies of nutrition choices, exercise and eliminate habits that are contributing to their cardiac risk. We agreed to a plan to decrease his cardiovascular risks. Discussed ASA. Reviewed Guidelines and approved recommendations made to patient.   The patient's 10 yr CV risk was estimated at  31.2 %  IO 4/25     HLD: LDL 68 and HDL 62 on labs in 2/25   She had aching in her legs with atorvastatin   Continue atorvastatin 10 mg M,W,F.    Ca cardiac score 6/22 was 139 right side, small 4 mm nodule right lobe appears benign and is small   Recommend she look into a plant based/ whole foods diet       Familial tremors bilateral hands:   She has a very fine tremor to her right hand  She has had this for years     She has sinus issues and this causes HA at times:    She is drinking warm fluids in the morning and this helps clear the PND   She is using a humidifier in her bedroom at night and continue   She started a gel NS and that seems to help      BLE bilaterally:    Limit sodium intake    Recommend doing ABC exercises with her feet in the morning   Recommend she elevate her legs prn edema   We stopped Maxzide due to LE cramping and up all night.    She declines medications for LE edema       LE varicose veins:    She has lipomatous tissue on the inner thighs bilaterally especially around the knees.   We discussed weight loss to decrease LE edema and help with arthritic pain   I referred her to Dr Maxwell for varicose vein evaluation, she did not keep appointment   Recommend wearing compression stockings at 20 - 30 mmHg.   Recommend elevating her legs for 45 minutes every afternoon     Insomnia and anxiety issues persist:   She has increased stressors with her husbands  declining health   He has bladder cancer and finished chemo treatment. She is primary caregiver   She had 2 daughters from her first marriage and they have both passed.    The cause of death for one of her daughters was HTN cardiovascular disease.   The other daughter had COPD and was 57.    She lost her sister at age 57 the same year her mother passed    Her ruperto keeps her strong   She has friends she goes to lunch with, plays cards with and shops with.   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    She is to avoid long term use of NSAIDs with Cymbalta   She takes Ativan at night then she sits and relaxes while rocking in the rocking chair    She is no longer taking Ambien   Refills given, OARRS run without flags 4/22/25     She has continuous low risk for abuse of medication. She has good response with medications still. There has been no escalation of dosage with her medications   I reviewed the reasons patient is on these medications and have found that this course of treatment for her is appropriate 2/25  Reviewed Controlled Substance Agreement 2/25     OARRS:  Dr Katarzyna Vickers MD   I have personally reviewed the OARRS report for Mirtha DANIEL Shree. I have considered the risks of abuse, dependence, addiction and diversion  Is the patient prescribed a combination of a benzodiazepine and opioid?  No  Last Urine Drug Screen / ordered today: No    Pain Management Panel  More data exists         Latest Ref Rng & Units 7/5/2023 4/29/2022   Pain Management Panel   Amphetamine Screen, Urine NEGATIVE PRESUMPTIVE NEGATIVE  PRESUMPTIVE NEGATIVE    Barbiturate Screen, Urine NEGATIVE PRESUMPTIVE NEGATIVE  PRESUMPTIVE NEGATIVE    Codeine IgE Cutoff <50 ng/mL <50  482    Hydromorphone Urine Cutoff <25 ng/mL <25  <25    Morphine  Cutoff <50 ng/mL <50  <50        Results are as expected.   Controlled Substance Agreement:  Date of the Last  Agreement: 4/2/2024. Urine drug screen 7/5/2023  Reviewed Controlled Substance Agreement including but not limited to the benefits, risks, and alternatives to treatment with a Controlled Substance medication(s).  Benzodiazepines:  What is the patient's goal of therapy? Decrease anxiety   Is this being achieved with current treatment? yes  Activities of Daily Living:   Is your overall impression that this patient is benefiting (symptom reduction outweighs side effects) from benzodiazepine therapy? Yes   1. Physical Functioning: Better  2. Family Relationship: Better  3. Social Relationship: Better  4. Mood: Better  5. Sleep Patterns: Better  6. Overall Function: Better      Arthritis:   She hurts when she first gets up and starts moving then the pain eases off   Continue Cymbalta 30 mg daily      Right knee is positive DJD:     This has been worse with weight gain.  Xray bilateral knees 8/2021 showed arthritis only   Recommend using a knee brace when active      Vitamin D def: Vitamin D 33 on labs in 4/2023.   Goal for Vitamin D to be 35 or higher due to history of breast cancer   Discontinue scripted Vitamin D due to concerns for bone loss with higher doses   Continue OTC Vitamin D3 5000 UT daily      Vitamin B 12 def: Levels 43 on labs in 4/25   Continue OTC Vitamin B 12 SL 1000 UT daily   Discussed foods rich in Vitamin B 12      She has a history of Left breast Ca+ in 2/15.   Mammo normal in 7/2024 and ordered 2/25   Breast exam normal 4/26     BD in 7/2023 T-2.3. Long discussion explaining medications that will help with her bone density (Prolia, Fosamax and Reclast) along with their possible side effects 2/25.  She declines Fosamax or like medication. She declines Prolia due to cost.    She is concerned about Fosamax because her medications get caught in her throat.   Continue OTC Caltrate daily, OTC Vitamin D3 5000 UT daily and eat 2 servings of calcium enriched foods daily. She would like to repeat the bone  density first. Orders placed for bone density 2/25  Discussed importance of weight bearing exercise.      Colon in 12/07 normal and no longer needs to repeat   Cologuard in 12/2021 was normal and no longer needs to repeat       Ophth:    Seen at [facility] 2024. No glaucoma /MD. She had cataract extraction bilaterally in 2024. Her mother had AMD.       I spent 15 min with the patient discussing their wishes for end of life choices.   We discussed the need for a Living Will and that wishes should be discussed with Family. The DNR status was reviewed, and we discussed the options of this and, the DNR _CC options as well.   We also went over how important it was to have these choices written down and clear for any surviving family so that their wishes are followed   The patient and I came to to following agreement :   She has a living will and her  is her medical POA.  Her granddaughter is her secondary.    Copy of her Advanced Directives scanned in her chart 5/2022        Hep C 3/18-  FLU 10/21, 12/22, 12/23, 2/25  TDAP 2011, will update with injury   RSV discussed and will consider   Prevnar 20 recommended and will consider   Pneumovax 10/22/2021  Shingrix recommended and will check local pharmacies   SARS-CoV-2- 3/21, 3/21, 11/21, 10/22, 1/24   Blood type O negative   Disability placard given 4/22 for 5 years      Some elements in the chart were copied from Dr. Vickers's last office visit with patient. Notes have been updated where appropriate, and reflect my current medical decision making from today.      RTC in 3 months for follow up and medication refill or sooner if needed   (MCR due 4/26, last mcr 4/22/25)      Scribe Attestation  By signing my name below, I, Linda Liam , Scribe   attest that this documentation has been prepared under the direction and in the presence of Katarzyna Vickers MD.

## 2025-04-22 ENCOUNTER — APPOINTMENT (OUTPATIENT)
Dept: PRIMARY CARE | Facility: CLINIC | Age: 80
End: 2025-04-22
Payer: MEDICARE

## 2025-04-22 VITALS
DIASTOLIC BLOOD PRESSURE: 70 MMHG | WEIGHT: 210 LBS | HEART RATE: 74 BPM | BODY MASS INDEX: 35.85 KG/M2 | OXYGEN SATURATION: 98 % | HEIGHT: 64 IN | SYSTOLIC BLOOD PRESSURE: 100 MMHG

## 2025-04-22 DIAGNOSIS — I10 BENIGN HYPERTENSION: ICD-10-CM

## 2025-04-22 DIAGNOSIS — M25.562 CHRONIC PAIN OF BOTH KNEES: ICD-10-CM

## 2025-04-22 DIAGNOSIS — F51.01 PRIMARY INSOMNIA: ICD-10-CM

## 2025-04-22 DIAGNOSIS — G47.00 INSOMNIA, UNSPECIFIED TYPE: ICD-10-CM

## 2025-04-22 DIAGNOSIS — E78.5 DYSLIPIDEMIA: ICD-10-CM

## 2025-04-22 DIAGNOSIS — G89.29 CHRONIC PAIN OF BOTH KNEES: ICD-10-CM

## 2025-04-22 DIAGNOSIS — M81.0 OSTEOPOROSIS OF LUMBAR SPINE: ICD-10-CM

## 2025-04-22 DIAGNOSIS — Z00.00 ROUTINE GENERAL MEDICAL EXAMINATION AT HEALTH CARE FACILITY: Primary | ICD-10-CM

## 2025-04-22 DIAGNOSIS — M19.90 ARTHRITIS: ICD-10-CM

## 2025-04-22 DIAGNOSIS — M25.561 CHRONIC PAIN OF BOTH KNEES: ICD-10-CM

## 2025-04-22 DIAGNOSIS — I48.20 CHRONIC ATRIAL FIBRILLATION (MULTI): ICD-10-CM

## 2025-04-22 PROCEDURE — 1158F ADVNC CARE PLAN TLK DOCD: CPT | Performed by: INTERNAL MEDICINE

## 2025-04-22 PROCEDURE — 99397 PER PM REEVAL EST PAT 65+ YR: CPT | Performed by: INTERNAL MEDICINE

## 2025-04-22 PROCEDURE — 99214 OFFICE O/P EST MOD 30 MIN: CPT | Performed by: INTERNAL MEDICINE

## 2025-04-22 PROCEDURE — 1160F RVW MEDS BY RX/DR IN RCRD: CPT | Performed by: INTERNAL MEDICINE

## 2025-04-22 PROCEDURE — 1036F TOBACCO NON-USER: CPT | Performed by: INTERNAL MEDICINE

## 2025-04-22 PROCEDURE — G0446 INTENS BEHAVE THER CARDIO DX: HCPCS | Performed by: INTERNAL MEDICINE

## 2025-04-22 PROCEDURE — G0439 PPPS, SUBSEQ VISIT: HCPCS | Performed by: INTERNAL MEDICINE

## 2025-04-22 PROCEDURE — 3074F SYST BP LT 130 MM HG: CPT | Performed by: INTERNAL MEDICINE

## 2025-04-22 PROCEDURE — 1123F ACP DISCUSS/DSCN MKR DOCD: CPT | Performed by: INTERNAL MEDICINE

## 2025-04-22 PROCEDURE — 3078F DIAST BP <80 MM HG: CPT | Performed by: INTERNAL MEDICINE

## 2025-04-22 PROCEDURE — G0444 DEPRESSION SCREEN ANNUAL: HCPCS | Performed by: INTERNAL MEDICINE

## 2025-04-22 PROCEDURE — 1159F MED LIST DOCD IN RCRD: CPT | Performed by: INTERNAL MEDICINE

## 2025-04-22 PROCEDURE — 1157F ADVNC CARE PLAN IN RCRD: CPT | Performed by: INTERNAL MEDICINE

## 2025-04-22 PROCEDURE — 1170F FXNL STATUS ASSESSED: CPT | Performed by: INTERNAL MEDICINE

## 2025-04-22 RX ORDER — LORAZEPAM 1 MG/1
1 TABLET ORAL NIGHTLY
Qty: 30 TABLET | Refills: 2 | Status: SHIPPED | OUTPATIENT
Start: 2025-04-22 | End: 2025-07-21

## 2025-04-22 ASSESSMENT — ACTIVITIES OF DAILY LIVING (ADL)
DRESSING: INDEPENDENT
DOING_HOUSEWORK: INDEPENDENT
MANAGING_FINANCES: INDEPENDENT
BATHING: INDEPENDENT
TAKING_MEDICATION: INDEPENDENT
TAKING_MEDICATION: INDEPENDENT
GROCERY_SHOPPING: INDEPENDENT
MANAGING_FINANCES: INDEPENDENT
DOING_HOUSEWORK: INDEPENDENT
GROCERY_SHOPPING: INDEPENDENT

## 2025-04-22 ASSESSMENT — PATIENT HEALTH QUESTIONNAIRE - PHQ9
1. LITTLE INTEREST OR PLEASURE IN DOING THINGS: NOT AT ALL
SUM OF ALL RESPONSES TO PHQ9 QUESTIONS 1 AND 2: 0
2. FEELING DOWN, DEPRESSED OR HOPELESS: NOT AT ALL

## 2025-04-22 ASSESSMENT — ENCOUNTER SYMPTOMS
LOSS OF SENSATION IN FEET: 0
DEPRESSION: 0
OCCASIONAL FEELINGS OF UNSTEADINESS: 0

## 2025-04-22 NOTE — PROGRESS NOTES
"Subjective   Reason for Visit: Mirtha Swann is an 79 y.o. female here for a Medicare Wellness visit.     Past Medical, Surgical, and Family History reviewed and updated in chart.    Reviewed all medications by prescribing practitioner or clinical pharmacist (such as prescriptions, OTCs, herbal therapies and supplements) and documented in the medical record.    HPI    Patient Care Team:  Katarzyna Vickers MD as PCP - General  Katarzyna Vickers MD as PCP - [employer]vantage PCP     Review of Systems    Objective   Vitals:  /70 (BP Location: Left arm, Patient Position: Sitting)   Pulse 74   Ht 1.626 m (5' 4\")   Wt 95.3 kg (210 lb)   SpO2 98%   BMI 36.05 kg/m²       Physical Exam    Assessment & Plan  Routine general medical examination at health care facility    Orders:  •  1 Year Follow Up In [facility] - PCP - Wellness Exam; Future              "

## 2025-05-28 NOTE — PROGRESS NOTES
Add Amlodipine 5 mg to irbesartan/hctz and potassium    Bring in BP machine and readings next visit.   Subjective   Patient ID: Mirtha Swann is a 77 y.o. female who presents for her 3 month follow up with medication refill       She is grieving the passing of her daughter, she had COPD and was 56 y/o  She keeps looking at the roses she got her daughter on Saturday for her  2023  She had 2 daughters from her first marriage and they have both passed.   She lost one daughter over 20 years ago.   Her daughters live in boyfriend has to be out of the condo this month.   He is not doing well with the passing of his girlfriend   Her daughter never had children  Her daughter was bipolar and on Ativan and Ambien        HEALTH  PAP  and no longer needed   Mammo  , 2/15 ,  , 17, , orders placed 3/19, , 2021, 2022, 2023  BD in  T -1.1 hip and spine T-2.7, 2021 T-2.5, 2023 T-2.3  Colon  and Q 10 Dr Issa Turcios  - and 2021 - and Q 3  EKG  ,  , 3/18, 2021   Urine  , 1/15 ,  , , 3/18, 3/19   Hep C 3/18-  FLU 10/18/2021, 2022   TDAP , will update with injury   Prevnar recommended and will consider   Pneumovax 10/22/2021  Zostavax never and declines  Shingrix recommended and will check local pharmacies   Moderna CVD 3/2/2021 and 3/30/2021 Pfizer booster 2021, 10/25/2022  Ophth She was seen at [facility] , she wears glasses. No glaucoma /MD. She has bilateral cataracts OU but not ready for surgery. Her mother had AMD.   Copy of her Advanced Directives scanned in her chart 2022       Review of Systems  All systems negative except those listed in the HPI      Objective   Visit Vitals  /80   Pulse 68   Temp 35.8 °C (96.5 °F) (Temporal)    Body mass index is 39.14 kg/m².     Physical Exam  Vitals reviewed.   Constitutional:       Appearance: Normal appearance. She is obese.   HENT:      Head: Normocephalic.      Right Ear: Tympanic membrane, ear canal and external ear normal.      Left Ear: Tympanic membrane, ear canal  and external ear normal.      Nose: Nose normal.      Mouth/Throat:      Pharynx: Oropharynx is clear.   Eyes:      Conjunctiva/sclera: Conjunctivae normal.   Cardiovascular:      Rate and Rhythm: Normal rate and regular rhythm.      Pulses: Normal pulses.      Heart sounds: Normal heart sounds.      Comments: trace edema BLE   Pulmonary:      Effort: Pulmonary effort is normal.      Breath sounds: Normal breath sounds.   Abdominal:      General: Bowel sounds are normal.      Palpations: Abdomen is soft.   Musculoskeletal:         General: Normal range of motion.      Cervical back: Normal range of motion and neck supple.   Skin:     General: Skin is warm.   Neurological:      General: No focal deficit present.      Mental Status: She is alert and oriented to person, place, and time.   Psychiatric:         Mood and Affect: Mood normal.         Behavior: Behavior normal.         Thought Content: Thought content normal.         Judgment: Judgment normal.       Assessment/Plan   Problem List Items Addressed This Visit       Benign hypertension - Primary    Chronic pain of both knees    Dyslipidemia    Relevant Orders    CBC    Comprehensive Metabolic Panel    Lipid Panel    Edema    Insomnia      Follow up completed    She has trouble hearing when there is a lot of background noise      Her weight in office today is 228 with BMI of 39.14. We spent 15 minutes discussing diet and weight loss. The struggle of weight loss persists   She makes a healthy breakfast. She sometimes skips lunch.  If she eats a large lunch she will eat 1/2 a sandwich for dinner   She does all the cleaning and cooking   She is her husbands caregiver and has a hard time finding the time to exercise   She does like her sweets and eats fresh donuts weekly   I would like to see her BMI below 30.      HTN: Stable.   Continue losartan 50 mg daily and atenolol 25 mg QHS.   Recommend she decrease her coffee intake   EKG was normal sinus HR 80 in 7/2021,  no LVH or strain pattern noted   She monitors her daily sodium intake. Increase hydration with water   Continue to monitor her BP at home and call my office with elevated readings.      I have spent 15 min face to face with this patient discussing their cardiac risk and behavioral therapies of nutrition choices and exercise. We are trying to eliminate habits that are contributing to their cardiac risk.  We agreed on a plan of how they can reduce their current CV risk   The patient's  10 yr CV risk was estimated at 27.1 % 2023      HLD: Stable. 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  Neurontin did not help.      Insomnia and anxiety issues persist: She is grieving the passing of her daughter, she had COPD and was 57 y/o. She keeps looking at the roses she got her daughter on Saturday for her  2023. She had 2 daughters from her first marriage and they have both passed. She lost one daughter over 20 years ago  She is the primary caregiver to her  who continues to decline.  He has bladder cancer and finished chemo treatment. He is 81 y/o.   She lost her sister at age 57 the same year her mother passed    Her ruperto keeps her strong   She has friends she goes to lunch with, plays cards with and shops with.    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 She is to avoid long term use of NSAIDs with Cymbalta   She sits in a chair in the quiet for an hour after taking the lorazepam at night to relax.   She is no longer taking Ambien   Refills given, OARRS run without flags 4/24/2023    OARRS:  Dr Katarzyna Vickers MD   I have personally reviewed the OARRS report for Mirtha Swann. I have considered the risks of abuse, dependence, addiction and diversion  Is the patient prescribed a combination of a benzodiazepine and opioid?  No  Last Urine Drug Screen / ordered today: No  Recent Results (from the past 42280 hour(s))   OPIATE/OPIOID/BENZO PRESCRIPTION COMPLIANCE    Collection Time: 07/05/23 10:24 AM   Result Value Ref Range    DRUG SCREEN COMMENT URINE SEE BELOW     Creatine, Urine 31.3 mg/dL    Amphetamine Screen, Urine PRESUMPTIVE NEGATIVE NEGATIVE    Barbiturate Screen, Urine PRESUMPTIVE NEGATIVE NEGATIVE    Cannabinoid Screen, Urine PRESUMPTIVE NEGATIVE NEGATIVE    Cocaine Screen, Urine PRESUMPTIVE NEGATIVE NEGATIVE    PCP Screen, Urine PRESUMPTIVE NEGATIVE NEGATIVE    7-Aminoclonazepam <25 Cutoff <25 ng/mL    Alpha-Hydroxyalprazolam <25 Cutoff <25 ng/mL    Alpha-Hydroxymidazolam <25 Cutoff <25 ng/mL    Alprazolam <25 Cutoff <25 ng/mL    Chlordiazepoxide <25 Cutoff <25 ng/mL    Clonazepam <25 Cutoff <25 ng/mL    Diazepam <25 Cutoff <25 ng/mL    Lorazepam 247 (A) Cutoff <25 ng/mL    Midazolam <25 Cutoff <25 ng/mL    Nordiazepam <25 Cutoff <25 ng/mL    Oxazepam <25 Cutoff <25 ng/mL    Temazepam <25 Cutoff <25 ng/mL    Zolpidem <25 Cutoff <25 ng/mL    Zolpidem Metabolite (ZCA) <25 Cutoff <25 ng/mL    6-Acetylmorphine <25 Cutoff <25 ng/mL    Codeine <50 Cutoff <50 ng/mL    Hydrocodone <25 Cutoff <25 ng/mL    Hydromorphone <25 Cutoff <25 ng/mL    Morphine Urine <50 Cutoff <50 ng/mL    Norhydrocodone <25 Cutoff <25 ng/mL    Noroxycodone <25  Cutoff <25 ng/mL    Oxycodone <25 Cutoff <25 ng/mL    Oxymorphone <25 Cutoff <25 ng/mL    Tramadol <50 Cutoff <50 ng/mL    O-Desmethyltramadol <50 Cutoff <50 ng/mL    Fentanyl <2.5 Cutoff<2.5 ng/mL    Norfentanyl <2.5 Cutoff<2.5 ng/mL    METHADONE CONFIRMATION,URINE <25 Cutoff <25 ng/mL    EDDP <25 Cutoff <25 ng/mL     Results are as expected.   Controlled Substance Agreement:  Date of the Last Agreement: 4/24/2023. Urine drug screen 7/5/2023  Reviewed Controlled Substance Agreement including but not limited to the benefits, risks, and alternatives to treatment with a Controlled Substance medication(s).  Benzodiazepines:  What is the patient's goal of therapy? Decrease anxiety   Is this being achieved with current treatment? yes  Activities of Daily Living:   Is your overall impression that this patient is benefiting (symptom reduction outweighs side effects) from benzodiazepine therapy? Yes   1. Physical Functioning: Better  2. Family Relationship: Better  3. Social Relationship: Better  4. Mood: Better  5. Sleep Patterns: Better  6. Overall Function: Better      LE edema and varicose veins: On exam: trace edema BLE 8/2023  She has lipomatous tissue on the inner thighs bilaterally especially around the knees.   We discussed weight loss to decrease LE edema and help with arthritic pain   She has varicose veins, she is not sure she wants to have this corrected   We stopped Maxzide due to LE cramping and up all night.   I referred her to Dr Maxwell for varicose vein evaluation, she did not keep appointment   Recommend wearing compression stockings at 20 - 30 mmHg.   Recommend elevating her legs for 45 minutes every afternoon  She declines medications for LE edema      Arthritis:   I would like for her to avoid Naproxen as much as possible   Continue Cymbalta 30 mg daily   Once she gets moving she does better      Right knee is positive DJD:   This has been worse with weight gain.  Xray bilateral knees 8/2021 showed  arthritis only      Vitamin D def: Vitamin D 33 on labs in 4/2023.   Goal for Vitamin D to be 35 or higher due to history of breast cancer   Continue scripted Vitamin D once weekly.      Vitamin B 12 def: Levels normal in 4/2023  Continue OTC Vitamin D SL 1000 UT daily      She has a history of Left breast Ca+ in 2/15.   Mammo normal in 7/2023  Breast exam normal 4/2023    BD in 7/2023 T-2.3. Long discussion explaining results of BD to patient 8/2023.   She declines Fosamax or like medication. She is concerned about the medication because her systemic medications get caught in her throat.   She declines Prolia due to cost.   Continue Caltrate daily and eat 2 servings of calcium enriched foods daily. Continue scripted Vitamin D weekly. Encouraged weight bearing exercise.      Colon in 12/07 and she will see Dr Parsons 4/18 for the last colonoscopy needed.  Cologuard in 12/2021 was normal and Q 3      Ophth:   She was seen at [facility] 2022, she wears glasses. No glaucoma /MD.   She has bilateral cataracts OU but not ready for surgery. Her mother had AMD.   She will have her next eye exam faxed to my office in order to update her medical records.     She has a living will and her  is her medical POA, her daughter and granddaughter are her secondary medical POA.   Copy of her Advanced Directives scanned in her chart 4/2022        Hep C 3/18-  FLU 10/18/2021, 12/2022   TDAP 2011, will update with injury   Prevnar recommended and will consider   Pneumovax 10/22/2021  Zostavax never and declines  Shingrix recommended and will check local pharmacies   Moderna CVD vaccine 3/2/2021 and 3/30/2021 Pfizer booster 11/2/2021, 10/25/2022  Blood type O negative   Disability placard given 4/29/2022 for 5 years     Some elements in the chart were copied from Dr. Vickers's last office visit with patient.   Notes have been updated where appropriate, and reflect my current medical decision making from today.      RTC in 3 months  for follow up and medication refill or sooner if needed   (MCR due 4/2024)      Scribe Attestation  By signing my name below, I, Linda Wheeler , Mariaelena   attest that this documentation has been prepared under the direction and in the presence of Katarzyna Vickers MD.

## 2025-07-30 DIAGNOSIS — I10 BENIGN HYPERTENSION: ICD-10-CM

## 2025-07-30 RX ORDER — ATENOLOL 25 MG/1
25 TABLET ORAL DAILY
Qty: 90 TABLET | Refills: 0 | Status: SHIPPED | OUTPATIENT
Start: 2025-07-30 | End: 2025-07-31

## 2025-07-31 DIAGNOSIS — I10 BENIGN HYPERTENSION: ICD-10-CM

## 2025-07-31 RX ORDER — ATENOLOL 25 MG/1
25 TABLET ORAL DAILY
Qty: 90 TABLET | Refills: 0 | Status: SHIPPED | OUTPATIENT
Start: 2025-07-31

## 2025-08-01 DIAGNOSIS — G47.00 INSOMNIA, UNSPECIFIED TYPE: ICD-10-CM

## 2025-08-01 RX ORDER — LORAZEPAM 1 MG/1
1 TABLET ORAL NIGHTLY
Qty: 30 TABLET | Refills: 2 | Status: SHIPPED | OUTPATIENT
Start: 2025-08-01 | End: 2025-10-30

## 2025-08-04 ENCOUNTER — APPOINTMENT (OUTPATIENT)
Dept: RADIOLOGY | Facility: CLINIC | Age: 80
End: 2025-08-04
Payer: MEDICARE

## 2025-08-04 ENCOUNTER — HOSPITAL ENCOUNTER (OUTPATIENT)
Dept: RADIOLOGY | Facility: CLINIC | Age: 80
Discharge: HOME | End: 2025-08-04
Payer: MEDICARE

## 2025-08-04 DIAGNOSIS — Z12.31 VISIT FOR SCREENING MAMMOGRAM: ICD-10-CM

## 2025-08-04 PROCEDURE — 77063 BREAST TOMOSYNTHESIS BI: CPT

## 2025-08-04 PROCEDURE — 77063 BREAST TOMOSYNTHESIS BI: CPT | Performed by: RADIOLOGY

## 2025-08-04 PROCEDURE — 77067 SCR MAMMO BI INCL CAD: CPT | Performed by: RADIOLOGY

## 2025-08-06 ENCOUNTER — TELEPHONE (OUTPATIENT)
Dept: PRIMARY CARE | Facility: CLINIC | Age: 80
End: 2025-08-06
Payer: MEDICARE

## 2025-08-14 ENCOUNTER — APPOINTMENT (OUTPATIENT)
Dept: PRIMARY CARE | Facility: CLINIC | Age: 80
End: 2025-08-14
Payer: MEDICARE

## 2025-08-14 VITALS
HEIGHT: 64 IN | BODY MASS INDEX: 35.68 KG/M2 | WEIGHT: 209 LBS | OXYGEN SATURATION: 97 % | HEART RATE: 83 BPM | DIASTOLIC BLOOD PRESSURE: 60 MMHG | SYSTOLIC BLOOD PRESSURE: 118 MMHG

## 2025-08-14 DIAGNOSIS — M25.562 CHRONIC PAIN OF BOTH KNEES: ICD-10-CM

## 2025-08-14 DIAGNOSIS — E66.01 SEVERE OBESITY (BMI 35.0-35.9 WITH COMORBIDITY) (MULTI): ICD-10-CM

## 2025-08-14 DIAGNOSIS — M25.561 CHRONIC PAIN OF BOTH KNEES: ICD-10-CM

## 2025-08-14 DIAGNOSIS — R00.0 TACHYCARDIA: ICD-10-CM

## 2025-08-14 DIAGNOSIS — F51.01 PRIMARY INSOMNIA: ICD-10-CM

## 2025-08-14 DIAGNOSIS — I10 BENIGN HYPERTENSION: Primary | ICD-10-CM

## 2025-08-14 DIAGNOSIS — M19.90 ARTHRITIS: ICD-10-CM

## 2025-08-14 DIAGNOSIS — G89.29 CHRONIC PAIN OF BOTH KNEES: ICD-10-CM

## 2025-08-14 DIAGNOSIS — M81.0 OSTEOPOROSIS OF LUMBAR SPINE: ICD-10-CM

## 2025-08-14 PROCEDURE — 3074F SYST BP LT 130 MM HG: CPT | Performed by: INTERNAL MEDICINE

## 2025-08-14 PROCEDURE — 1036F TOBACCO NON-USER: CPT | Performed by: INTERNAL MEDICINE

## 2025-08-14 PROCEDURE — 1160F RVW MEDS BY RX/DR IN RCRD: CPT | Performed by: INTERNAL MEDICINE

## 2025-08-14 PROCEDURE — 3078F DIAST BP <80 MM HG: CPT | Performed by: INTERNAL MEDICINE

## 2025-08-14 PROCEDURE — 1159F MED LIST DOCD IN RCRD: CPT | Performed by: INTERNAL MEDICINE

## 2025-08-14 PROCEDURE — 99214 OFFICE O/P EST MOD 30 MIN: CPT | Performed by: INTERNAL MEDICINE

## 2025-08-14 PROCEDURE — G2211 COMPLEX E/M VISIT ADD ON: HCPCS | Performed by: INTERNAL MEDICINE

## 2025-08-14 RX ORDER — DULOXETIN HYDROCHLORIDE 30 MG/1
30 CAPSULE, DELAYED RELEASE ORAL DAILY
Qty: 90 CAPSULE | Refills: 3 | Status: SHIPPED | OUTPATIENT
Start: 2025-08-14

## 2025-08-14 RX ORDER — LOSARTAN POTASSIUM 50 MG/1
50 TABLET ORAL DAILY
Qty: 90 TABLET | Refills: 3 | Status: SHIPPED | OUTPATIENT
Start: 2025-08-14

## 2025-11-19 ENCOUNTER — APPOINTMENT (OUTPATIENT)
Dept: PRIMARY CARE | Facility: CLINIC | Age: 80
End: 2025-11-19
Payer: MEDICARE

## 2026-03-30 ENCOUNTER — APPOINTMENT (OUTPATIENT)
Dept: PRIMARY CARE | Facility: CLINIC | Age: 81
End: 2026-03-30
Payer: MEDICARE